# Patient Record
Sex: FEMALE | Race: WHITE | Employment: OTHER | URBAN - METROPOLITAN AREA
[De-identification: names, ages, dates, MRNs, and addresses within clinical notes are randomized per-mention and may not be internally consistent; named-entity substitution may affect disease eponyms.]

---

## 2018-12-06 ENCOUNTER — OFFICE VISIT (OUTPATIENT)
Dept: ORTHOPEDIC SURGERY | Age: 58
End: 2018-12-06
Payer: COMMERCIAL

## 2018-12-06 VITALS
TEMPERATURE: 97.3 F | WEIGHT: 120 LBS | HEART RATE: 61 BPM | DIASTOLIC BLOOD PRESSURE: 78 MMHG | SYSTOLIC BLOOD PRESSURE: 116 MMHG | HEIGHT: 65 IN | BODY MASS INDEX: 19.99 KG/M2

## 2018-12-06 DIAGNOSIS — Z96.641 H/O TOTAL HIP ARTHROPLASTY, RIGHT: ICD-10-CM

## 2018-12-06 DIAGNOSIS — Z96.652 H/O TOTAL KNEE REPLACEMENT, LEFT: ICD-10-CM

## 2018-12-06 DIAGNOSIS — Z96.651 H/O TOTAL KNEE REPLACEMENT, RIGHT: ICD-10-CM

## 2018-12-06 DIAGNOSIS — M25.561 RIGHT KNEE PAIN, UNSPECIFIED CHRONICITY: Primary | ICD-10-CM

## 2018-12-06 PROCEDURE — G8427 DOCREV CUR MEDS BY ELIG CLIN: HCPCS | Performed by: ORTHOPAEDIC SURGERY

## 2018-12-06 PROCEDURE — 99203 OFFICE O/P NEW LOW 30 MIN: CPT | Performed by: ORTHOPAEDIC SURGERY

## 2018-12-06 PROCEDURE — 3017F COLORECTAL CA SCREEN DOC REV: CPT | Performed by: ORTHOPAEDIC SURGERY

## 2018-12-06 PROCEDURE — 1036F TOBACCO NON-USER: CPT | Performed by: ORTHOPAEDIC SURGERY

## 2018-12-06 PROCEDURE — G8420 CALC BMI NORM PARAMETERS: HCPCS | Performed by: ORTHOPAEDIC SURGERY

## 2018-12-06 PROCEDURE — G8484 FLU IMMUNIZE NO ADMIN: HCPCS | Performed by: ORTHOPAEDIC SURGERY

## 2018-12-06 RX ORDER — ADALIMUMAB 40MG/0.4ML
KIT SUBCUTANEOUS
COMMUNITY
Start: 2018-11-08

## 2018-12-06 RX ORDER — PRAMIPEXOLE DIHYDROCHLORIDE 0.25 MG/1
0.25 TABLET ORAL NIGHTLY
COMMUNITY
Start: 2018-09-22

## 2018-12-06 NOTE — PROGRESS NOTES
Chief Complaint:   Chief Complaint   Patient presents with    Knee Pain     Rt knee pain x 1 year. Hx Rt TKA  Difficutly walking. No recent X-rays        HPI 50-year-old woman with history of rheumatoid arthritis. History of bilateral total hip arthroplasties and bilateral total knee arthroplasties done elsewhere. She did have revision of her left knee tibial poly-by me several years ago due to persistent pain and effusions. Infection was never diagnosed. Left knee has done well since then. She had been having some similar symptoms in the right knee, not to the level of pursuing revision. This apparently improved although recently she's had some increased discomfort in the right knee and thigh activity related. No injury history.     Patient Active Problem List   Diagnosis    Rheumatoid arthritis ([address]    H/O total knee replacement, right    H/O total hip arthroplasty, right    H/O total knee replacement, left       Past Medical History:   Diagnosis Date    Hypothyroidism     Rheumatoid arthritis ([address]        Past Surgical History:   Procedure Laterality Date    ANKLE FRACTURE SURGERY Left 2016    ORIF Lt ankle fracture    BACK SURGERY  2000's    ELBOW FRACTURE SURGERY Left 2015    Periprosthetic fracture    ELBOW SURGERY Right 1990's    Joint replacement    ELBOW SURGERY Left 1990's    Joint Replacement    HAND SURGERY Bilateral     Fusions    HIP SURGERY Right 1981    Rt TON    HIP SURGERY Left 1990    Lt TON    KNEE SURGERY Bilateral     Bilateral TKA's     PARTIAL HYSTERECTOMY  1999    SHOULDER ARTHROSCOPY Right     Cuff repair       Current Outpatient Prescriptions   Medication Sig Dispense Refill    HUMIRA PEN 40 MG/0.4ML PNKT every 14 days       pramipexole (MIRAPEX) 0.25 MG tablet Take 0.25 mg by mouth nightly       folic acid (FOLVITE) 1 MG tablet       levothyroxine (SYNTHROID) 75 MCG tablet Take 75 mcg by mouth Daily       methotrexate 2.5 MG tablet once a week Takes 6 pills (15 mg once a week on Mondays)      predniSONE (DELTASONE) 5 MG tablet Take 5 mg by mouth daily       Calcium + D (CALTRATE) 600-200 MG-UNIT tablet Take 1 tablet by mouth daily.  NITRO-BID 2 % ointment       Zinc 50 MG CAPS Take  by mouth.  Biotin 1000 MCG TABS Take  by mouth. No current facility-administered medications for this visit. No Known Allergies    Social History     Social History    Marital status:      Spouse name: N/A    Number of children: N/A    Years of education: N/A     Social History Main Topics    Smoking status: Never Smoker    Smokeless tobacco: Never Used    Alcohol use No    Drug use: No    Sexual activity: Not Asked     Other Topics Concern    None     Social History Narrative    None       Family History   Problem Relation Age of Onset    Diabetes Father     Pacemaker Father          Review of Systems   No fever, chills, or other constitutionalsymptoms. No numbness or other neuro symptoms. [unfilled]   She is walking independent relief for weightbearing with a slight antalgic limp favoring the right thigh. Leg lengths are equal. Full rotation of right hip without pain. No pain with piston testing. Right knee has no effusion or crepitation with full range of motion. Physical Exam    Patient is alert and oriented. Well-developed well-nourished. Overall appearance consistent with chronic rheumatoid illness. BMI 20  Pupils equal and reactive. Scleraeanicteric. Neck supple  Lungs clear. Cardiac rate and rhythm regular. Abdomen soft and nontender. Skin warm and dry. XRAY: AP pelvis x-ray with AP and lateral views right hip today. Bilateral total hip arthroplasties are noted. The left hip has an oversized acetabular component with screws and a cerclage wire around the femur. Right hip is primary implant with no visible wear lucencies or subsidence on either implant.  Femoral heads bilaterally may be slightly off center

## 2019-01-21 ENCOUNTER — OFFICE VISIT (OUTPATIENT)
Dept: ORTHOPEDIC SURGERY | Age: 59
End: 2019-01-21
Payer: COMMERCIAL

## 2019-01-21 VITALS
HEIGHT: 65 IN | SYSTOLIC BLOOD PRESSURE: 113 MMHG | WEIGHT: 120 LBS | DIASTOLIC BLOOD PRESSURE: 68 MMHG | BODY MASS INDEX: 19.99 KG/M2 | TEMPERATURE: 97.8 F | HEART RATE: 62 BPM

## 2019-01-21 DIAGNOSIS — M25.551 PAIN OF RIGHT HIP JOINT: Primary | ICD-10-CM

## 2019-01-21 DIAGNOSIS — M54.50 LUMBAR PAIN: ICD-10-CM

## 2019-01-21 DIAGNOSIS — G89.29 CHRONIC PAIN OF RIGHT KNEE: ICD-10-CM

## 2019-01-21 DIAGNOSIS — M25.561 CHRONIC PAIN OF RIGHT KNEE: ICD-10-CM

## 2019-01-21 PROCEDURE — G8427 DOCREV CUR MEDS BY ELIG CLIN: HCPCS | Performed by: ORTHOPAEDIC SURGERY

## 2019-01-21 PROCEDURE — 3017F COLORECTAL CA SCREEN DOC REV: CPT | Performed by: ORTHOPAEDIC SURGERY

## 2019-01-21 PROCEDURE — 1036F TOBACCO NON-USER: CPT | Performed by: ORTHOPAEDIC SURGERY

## 2019-01-21 PROCEDURE — G8484 FLU IMMUNIZE NO ADMIN: HCPCS | Performed by: ORTHOPAEDIC SURGERY

## 2019-01-21 PROCEDURE — G8420 CALC BMI NORM PARAMETERS: HCPCS | Performed by: ORTHOPAEDIC SURGERY

## 2019-01-21 PROCEDURE — 99214 OFFICE O/P EST MOD 30 MIN: CPT | Performed by: ORTHOPAEDIC SURGERY

## 2019-01-31 ENCOUNTER — HOSPITAL ENCOUNTER (OUTPATIENT)
Age: 59
Discharge: HOME | End: 2019-02-02
Payer: COMMERCIAL

## 2019-01-31 LAB
ALBUMIN SERPL-MCNC: 4 G/DL (ref 3.5–5.2)
ALP BLD-CCNC: 54 U/L (ref 35–104)
ALT SERPL-CCNC: 11 U/L (ref 0–32)
ANION GAP SERPL CALCULATED.3IONS-SCNC: 11 MMOL/L (ref 7–16)
AST SERPL-CCNC: 22 U/L (ref 0–31)
BASOPHILS ABSOLUTE: 0.04 E9/L (ref 0–0.2)
BASOPHILS RELATIVE PERCENT: 0.5 % (ref 0–2)
BILIRUB SERPL-MCNC: 0.4 MG/DL (ref 0–1.2)
BUN BLDV-MCNC: 24 MG/DL (ref 6–20)
CALCIUM SERPL-MCNC: 9.7 MG/DL (ref 8.6–10.2)
CHLORIDE BLD-SCNC: 106 MMOL/L (ref 98–107)
CO2: 26 MMOL/L (ref 22–29)
CREAT SERPL-MCNC: 0.9 MG/DL (ref 0.5–1)
EOSINOPHILS ABSOLUTE: 0.04 E9/L (ref 0.05–0.5)
EOSINOPHILS RELATIVE PERCENT: 0.5 % (ref 0–6)
GFR AFRICAN AMERICAN: >60
GFR NON-AFRICAN AMERICAN: >60 ML/MIN/1.73
GLUCOSE BLD-MCNC: 83 MG/DL (ref 74–99)
HCT VFR BLD CALC: 38.5 % (ref 34–48)
HEMOGLOBIN: 12.6 G/DL (ref 11.5–15.5)
IMMATURE GRANULOCYTES #: 0.03 E9/L
IMMATURE GRANULOCYTES %: 0.4 % (ref 0–5)
LYMPHOCYTES ABSOLUTE: 0.95 E9/L (ref 1.5–4)
LYMPHOCYTES RELATIVE PERCENT: 12.7 % (ref 20–42)
MCH RBC QN AUTO: 31.8 PG (ref 26–35)
MCHC RBC AUTO-ENTMCNC: 32.7 % (ref 32–34.5)
MCV RBC AUTO: 97.2 FL (ref 80–99.9)
MONOCYTES ABSOLUTE: 0.59 E9/L (ref 0.1–0.95)
MONOCYTES RELATIVE PERCENT: 7.9 % (ref 2–12)
NEUTROPHILS ABSOLUTE: 5.81 E9/L (ref 1.8–7.3)
NEUTROPHILS RELATIVE PERCENT: 78 % (ref 43–80)
PDW BLD-RTO: 14.2 FL (ref 11.5–15)
PLATELET # BLD: 212 E9/L (ref 130–450)
PMV BLD AUTO: 10.1 FL (ref 7–12)
POTASSIUM SERPL-SCNC: 4.9 MMOL/L (ref 3.5–5)
RBC # BLD: 3.96 E12/L (ref 3.5–5.5)
RHEUMATOID FACTOR: 37 IU/ML (ref 0–13)
SODIUM BLD-SCNC: 143 MMOL/L (ref 132–146)
TOTAL PROTEIN: 7.3 G/DL (ref 6.4–8.3)
WBC # BLD: 7.5 E9/L (ref 4.5–11.5)

## 2019-01-31 PROCEDURE — 85025 COMPLETE CBC W/AUTO DIFF WBC: CPT

## 2019-01-31 PROCEDURE — 86200 CCP ANTIBODY: CPT

## 2019-01-31 PROCEDURE — 86431 RHEUMATOID FACTOR QUANT: CPT

## 2019-01-31 PROCEDURE — 80053 COMPREHEN METABOLIC PANEL: CPT

## 2019-01-31 PROCEDURE — 36415 COLL VENOUS BLD VENIPUNCTURE: CPT

## 2019-02-03 LAB — CCP IGG ANTIBODIES: 83 UNITS (ref 0–19)

## 2019-04-25 ENCOUNTER — HOSPITAL ENCOUNTER (OUTPATIENT)
Age: 59
Discharge: HOME | End: 2019-04-27
Payer: COMMERCIAL

## 2019-04-25 LAB
ALBUMIN SERPL-MCNC: 4.3 G/DL (ref 3.5–5.2)
ALP BLD-CCNC: 59 U/L (ref 35–104)
ALT SERPL-CCNC: 9 U/L (ref 0–32)
ANION GAP SERPL CALCULATED.3IONS-SCNC: 9 MMOL/L (ref 7–16)
AST SERPL-CCNC: 20 U/L (ref 0–31)
BASOPHILS ABSOLUTE: 0.04 E9/L (ref 0–0.2)
BASOPHILS RELATIVE PERCENT: 0.4 % (ref 0–2)
BILIRUB SERPL-MCNC: 0.4 MG/DL (ref 0–1.2)
BUN BLDV-MCNC: 22 MG/DL (ref 6–20)
C-REACTIVE PROTEIN: 0.2 MG/DL (ref 0–0.4)
CALCIUM SERPL-MCNC: 10.5 MG/DL (ref 8.6–10.2)
CHLORIDE BLD-SCNC: 107 MMOL/L (ref 98–107)
CO2: 29 MMOL/L (ref 22–29)
CREAT SERPL-MCNC: 0.8 MG/DL (ref 0.5–1)
EOSINOPHILS ABSOLUTE: 0.02 E9/L (ref 0.05–0.5)
EOSINOPHILS RELATIVE PERCENT: 0.2 % (ref 0–6)
GFR AFRICAN AMERICAN: >60
GFR NON-AFRICAN AMERICAN: >60 ML/MIN/1.73
GLUCOSE BLD-MCNC: 97 MG/DL (ref 74–99)
HCT VFR BLD CALC: 39.5 % (ref 34–48)
HEMOGLOBIN: 12.8 G/DL (ref 11.5–15.5)
IMMATURE GRANULOCYTES #: 0.03 E9/L
IMMATURE GRANULOCYTES %: 0.3 % (ref 0–5)
LYMPHOCYTES ABSOLUTE: 0.94 E9/L (ref 1.5–4)
LYMPHOCYTES RELATIVE PERCENT: 10.4 % (ref 20–42)
MCH RBC QN AUTO: 32.1 PG (ref 26–35)
MCHC RBC AUTO-ENTMCNC: 32.4 % (ref 32–34.5)
MCV RBC AUTO: 99 FL (ref 80–99.9)
MONOCYTES ABSOLUTE: 0.42 E9/L (ref 0.1–0.95)
MONOCYTES RELATIVE PERCENT: 4.6 % (ref 2–12)
NEUTROPHILS ABSOLUTE: 7.6 E9/L (ref 1.8–7.3)
NEUTROPHILS RELATIVE PERCENT: 84.1 % (ref 43–80)
PDW BLD-RTO: 14.4 FL (ref 11.5–15)
PLATELET # BLD: 227 E9/L (ref 130–450)
PMV BLD AUTO: 10.8 FL (ref 7–12)
POTASSIUM SERPL-SCNC: 4.6 MMOL/L (ref 3.5–5)
RBC # BLD: 3.99 E12/L (ref 3.5–5.5)
SODIUM BLD-SCNC: 145 MMOL/L (ref 132–146)
TOTAL PROTEIN: 7.4 G/DL (ref 6.4–8.3)
WBC # BLD: 9.1 E9/L (ref 4.5–11.5)

## 2019-04-25 PROCEDURE — 86140 C-REACTIVE PROTEIN: CPT

## 2019-04-25 PROCEDURE — 80053 COMPREHEN METABOLIC PANEL: CPT

## 2019-04-25 PROCEDURE — 85025 COMPLETE CBC W/AUTO DIFF WBC: CPT

## 2019-07-25 ENCOUNTER — HOSPITAL ENCOUNTER (OUTPATIENT)
Age: 59
Discharge: HOME | End: 2019-07-27
Payer: COMMERCIAL

## 2019-07-25 LAB
BASOPHILS ABSOLUTE: 0.03 E9/L (ref 0–0.2)
BASOPHILS RELATIVE PERCENT: 0.3 % (ref 0–2)
EOSINOPHILS ABSOLUTE: 0.01 E9/L (ref 0.05–0.5)
EOSINOPHILS RELATIVE PERCENT: 0.1 % (ref 0–6)
HCT VFR BLD CALC: 44.7 % (ref 34–48)
HEMOGLOBIN: 14 G/DL (ref 11.5–15.5)
IMMATURE GRANULOCYTES #: 0.05 E9/L
IMMATURE GRANULOCYTES %: 0.4 % (ref 0–5)
LYMPHOCYTES ABSOLUTE: 1.36 E9/L (ref 1.5–4)
LYMPHOCYTES RELATIVE PERCENT: 11.4 % (ref 20–42)
MCH RBC QN AUTO: 31.3 PG (ref 26–35)
MCHC RBC AUTO-ENTMCNC: 31.3 % (ref 32–34.5)
MCV RBC AUTO: 100 FL (ref 80–99.9)
MONOCYTES ABSOLUTE: 0.8 E9/L (ref 0.1–0.95)
MONOCYTES RELATIVE PERCENT: 6.7 % (ref 2–12)
NEUTROPHILS ABSOLUTE: 9.7 E9/L (ref 1.8–7.3)
NEUTROPHILS RELATIVE PERCENT: 81.1 % (ref 43–80)
PDW BLD-RTO: 13.9 FL (ref 11.5–15)
PLATELET # BLD: 248 E9/L (ref 130–450)
PMV BLD AUTO: 10.2 FL (ref 7–12)
RBC # BLD: 4.47 E12/L (ref 3.5–5.5)
WBC # BLD: 12 E9/L (ref 4.5–11.5)

## 2019-07-25 PROCEDURE — 80053 COMPREHEN METABOLIC PANEL: CPT

## 2019-07-25 PROCEDURE — 36415 COLL VENOUS BLD VENIPUNCTURE: CPT

## 2019-07-25 PROCEDURE — 85025 COMPLETE CBC W/AUTO DIFF WBC: CPT

## 2019-07-26 LAB
ALBUMIN SERPL-MCNC: 4.2 G/DL (ref 3.5–5.2)
ALP BLD-CCNC: 75 U/L (ref 35–104)
ALT SERPL-CCNC: 15 U/L (ref 0–32)
ANION GAP SERPL CALCULATED.3IONS-SCNC: 14 MMOL/L (ref 7–16)
AST SERPL-CCNC: 19 U/L (ref 0–31)
BILIRUB SERPL-MCNC: 0.4 MG/DL (ref 0–1.2)
BUN BLDV-MCNC: 25 MG/DL (ref 6–20)
CALCIUM SERPL-MCNC: 10.6 MG/DL (ref 8.6–10.2)
CHLORIDE BLD-SCNC: 100 MMOL/L (ref 98–107)
CO2: 28 MMOL/L (ref 22–29)
CREAT SERPL-MCNC: 0.8 MG/DL (ref 0.5–1)
GFR AFRICAN AMERICAN: >60
GFR NON-AFRICAN AMERICAN: >60 ML/MIN/1.73
GLUCOSE BLD-MCNC: 102 MG/DL (ref 74–99)
POTASSIUM SERPL-SCNC: 4.8 MMOL/L (ref 3.5–5)
SODIUM BLD-SCNC: 142 MMOL/L (ref 132–146)
TOTAL PROTEIN: 7.8 G/DL (ref 6.4–8.3)

## 2019-10-29 ENCOUNTER — HOSPITAL ENCOUNTER (OUTPATIENT)
Age: 59
Discharge: HOME | End: 2019-10-31
Payer: COMMERCIAL

## 2019-10-29 LAB
ALBUMIN SERPL-MCNC: 4.2 G/DL (ref 3.5–5.2)
ALP BLD-CCNC: 71 U/L (ref 35–104)
ALT SERPL-CCNC: 14 U/L (ref 0–32)
ANION GAP SERPL CALCULATED.3IONS-SCNC: 11 MMOL/L (ref 7–16)
AST SERPL-CCNC: 23 U/L (ref 0–31)
BASOPHILS ABSOLUTE: 0.04 E9/L (ref 0–0.2)
BASOPHILS RELATIVE PERCENT: 0.3 % (ref 0–2)
BILIRUB SERPL-MCNC: 0.3 MG/DL (ref 0–1.2)
BUN BLDV-MCNC: 25 MG/DL (ref 8–23)
C-REACTIVE PROTEIN: 0.3 MG/DL (ref 0–0.4)
CALCIUM SERPL-MCNC: 10.1 MG/DL (ref 8.6–10.2)
CHLORIDE BLD-SCNC: 103 MMOL/L (ref 98–107)
CO2: 29 MMOL/L (ref 22–29)
CREAT SERPL-MCNC: 0.9 MG/DL (ref 0.5–1)
EOSINOPHILS ABSOLUTE: 0.01 E9/L (ref 0.05–0.5)
EOSINOPHILS RELATIVE PERCENT: 0.1 % (ref 0–6)
GFR AFRICAN AMERICAN: >60
GFR NON-AFRICAN AMERICAN: >60 ML/MIN/1.73
GLUCOSE BLD-MCNC: 92 MG/DL (ref 74–99)
HCT VFR BLD CALC: 43.6 % (ref 34–48)
HEMOGLOBIN: 13 G/DL (ref 11.5–15.5)
IMMATURE GRANULOCYTES #: 0.06 E9/L
IMMATURE GRANULOCYTES %: 0.5 % (ref 0–5)
LYMPHOCYTES ABSOLUTE: 1.63 E9/L (ref 1.5–4)
LYMPHOCYTES RELATIVE PERCENT: 12.6 % (ref 20–42)
MCH RBC QN AUTO: 30.4 PG (ref 26–35)
MCHC RBC AUTO-ENTMCNC: 29.8 % (ref 32–34.5)
MCV RBC AUTO: 101.9 FL (ref 80–99.9)
MONOCYTES ABSOLUTE: 0.86 E9/L (ref 0.1–0.95)
MONOCYTES RELATIVE PERCENT: 6.7 % (ref 2–12)
NEUTROPHILS ABSOLUTE: 10.33 E9/L (ref 1.8–7.3)
NEUTROPHILS RELATIVE PERCENT: 79.8 % (ref 43–80)
PDW BLD-RTO: 14.3 FL (ref 11.5–15)
PLATELET # BLD: 315 E9/L (ref 130–450)
PMV BLD AUTO: 10.4 FL (ref 7–12)
POTASSIUM SERPL-SCNC: 4.8 MMOL/L (ref 3.5–5)
RBC # BLD: 4.28 E12/L (ref 3.5–5.5)
SODIUM BLD-SCNC: 143 MMOL/L (ref 132–146)
TOTAL PROTEIN: 7.9 G/DL (ref 6.4–8.3)
URIC ACID, SERUM: 4 MG/DL (ref 2.4–5.7)
VITAMIN D 25-HYDROXY: 67 NG/ML (ref 30–100)
WBC # BLD: 12.9 E9/L (ref 4.5–11.5)

## 2019-10-29 PROCEDURE — 86140 C-REACTIVE PROTEIN: CPT

## 2019-10-29 PROCEDURE — 36415 COLL VENOUS BLD VENIPUNCTURE: CPT

## 2019-10-29 PROCEDURE — 80053 COMPREHEN METABOLIC PANEL: CPT

## 2019-10-29 PROCEDURE — 84550 ASSAY OF BLOOD/URIC ACID: CPT

## 2019-10-29 PROCEDURE — 82306 VITAMIN D 25 HYDROXY: CPT

## 2019-10-29 PROCEDURE — 85025 COMPLETE CBC W/AUTO DIFF WBC: CPT

## 2020-01-23 ENCOUNTER — HOSPITAL ENCOUNTER (OUTPATIENT)
Age: 60
Discharge: HOME | End: 2020-01-25
Payer: COMMERCIAL

## 2020-01-23 LAB
ALBUMIN SERPL-MCNC: 4 G/DL (ref 3.5–5.2)
ALP BLD-CCNC: 64 U/L (ref 35–104)
ALT SERPL-CCNC: 8 U/L (ref 0–32)
ANION GAP SERPL CALCULATED.3IONS-SCNC: 18 MMOL/L (ref 7–16)
AST SERPL-CCNC: 19 U/L (ref 0–31)
BASOPHILS ABSOLUTE: 0.04 E9/L (ref 0–0.2)
BASOPHILS RELATIVE PERCENT: 0.4 % (ref 0–2)
BILIRUB SERPL-MCNC: <0.2 MG/DL (ref 0–1.2)
BUN BLDV-MCNC: 21 MG/DL (ref 8–23)
CALCIUM SERPL-MCNC: 9.8 MG/DL (ref 8.6–10.2)
CHLORIDE BLD-SCNC: 106 MMOL/L (ref 98–107)
CO2: 21 MMOL/L (ref 22–29)
CREAT SERPL-MCNC: 0.9 MG/DL (ref 0.5–1)
EOSINOPHILS ABSOLUTE: 0.08 E9/L (ref 0.05–0.5)
EOSINOPHILS RELATIVE PERCENT: 0.8 % (ref 0–6)
FERRITIN: 184 NG/ML
GFR AFRICAN AMERICAN: >60
GFR NON-AFRICAN AMERICAN: >60 ML/MIN/1.73
GLUCOSE BLD-MCNC: 109 MG/DL (ref 74–99)
HCT VFR BLD CALC: 41.4 % (ref 34–48)
HEMOGLOBIN: 12.8 G/DL (ref 11.5–15.5)
IMMATURE GRANULOCYTES #: 0.02 E9/L
IMMATURE GRANULOCYTES %: 0.2 % (ref 0–5)
LYMPHOCYTES ABSOLUTE: 0.97 E9/L (ref 1.5–4)
LYMPHOCYTES RELATIVE PERCENT: 9.4 % (ref 20–42)
MCH RBC QN AUTO: 29.6 PG (ref 26–35)
MCHC RBC AUTO-ENTMCNC: 30.9 % (ref 32–34.5)
MCV RBC AUTO: 95.8 FL (ref 80–99.9)
MONOCYTES ABSOLUTE: 0.69 E9/L (ref 0.1–0.95)
MONOCYTES RELATIVE PERCENT: 6.7 % (ref 2–12)
NEUTROPHILS ABSOLUTE: 8.5 E9/L (ref 1.8–7.3)
NEUTROPHILS RELATIVE PERCENT: 82.5 % (ref 43–80)
PDW BLD-RTO: 13.9 FL (ref 11.5–15)
PLATELET # BLD: 246 E9/L (ref 130–450)
PMV BLD AUTO: 10 FL (ref 7–12)
POTASSIUM SERPL-SCNC: 4.4 MMOL/L (ref 3.5–5)
RBC # BLD: 4.32 E12/L (ref 3.5–5.5)
SODIUM BLD-SCNC: 145 MMOL/L (ref 132–146)
TOTAL PROTEIN: 7.3 G/DL (ref 6.4–8.3)
WBC # BLD: 10.3 E9/L (ref 4.5–11.5)

## 2020-01-23 PROCEDURE — 36415 COLL VENOUS BLD VENIPUNCTURE: CPT

## 2020-01-23 PROCEDURE — 85025 COMPLETE CBC W/AUTO DIFF WBC: CPT

## 2020-01-23 PROCEDURE — 80053 COMPREHEN METABOLIC PANEL: CPT

## 2020-01-23 PROCEDURE — 82728 ASSAY OF FERRITIN: CPT

## 2020-02-20 ENCOUNTER — HOSPITAL ENCOUNTER (OUTPATIENT)
Age: 60
Discharge: HOME | End: 2020-02-22
Payer: COMMERCIAL

## 2020-02-20 PROCEDURE — 88175 CYTOPATH C/V AUTO FLUID REDO: CPT

## 2020-03-18 ENCOUNTER — TELEPHONE (OUTPATIENT)
Dept: ADMINISTRATIVE | Age: 60
End: 2020-03-18

## 2020-03-18 NOTE — TELEPHONE ENCOUNTER
New pt referred by Dr. Sarah Burnette for abnormal EKG. Pt does state she is scheduled for cataract surgery next wk and PCP would not clear her d/t EKG. Pt was encouraged to vián eye surgeon to see if surgery will still take place. Appt scheduled with Dr. Laura Vasquez on 3/19/20. No previous cardiologist.  Cardiac past history form scanned.

## 2020-03-19 ENCOUNTER — OFFICE VISIT (OUTPATIENT)
Dept: CARDIOLOGY CLINIC | Age: 60
End: 2020-03-19
Payer: COMMERCIAL

## 2020-03-19 VITALS
DIASTOLIC BLOOD PRESSURE: 78 MMHG | HEIGHT: 65 IN | BODY MASS INDEX: 20.64 KG/M2 | HEART RATE: 65 BPM | RESPIRATION RATE: 16 BRPM | WEIGHT: 123.9 LBS | SYSTOLIC BLOOD PRESSURE: 146 MMHG

## 2020-03-19 PROBLEM — R94.31 ABNORMAL EKG: Status: ACTIVE | Noted: 2020-03-19

## 2020-03-19 PROCEDURE — 99244 OFF/OP CNSLTJ NEW/EST MOD 40: CPT | Performed by: INTERNAL MEDICINE

## 2020-03-19 PROCEDURE — G8420 CALC BMI NORM PARAMETERS: HCPCS | Performed by: INTERNAL MEDICINE

## 2020-03-19 PROCEDURE — G8427 DOCREV CUR MEDS BY ELIG CLIN: HCPCS | Performed by: INTERNAL MEDICINE

## 2020-03-19 PROCEDURE — G8484 FLU IMMUNIZE NO ADMIN: HCPCS | Performed by: INTERNAL MEDICINE

## 2020-03-19 PROCEDURE — 93000 ELECTROCARDIOGRAM COMPLETE: CPT | Performed by: INTERNAL MEDICINE

## 2020-03-19 NOTE — PROGRESS NOTES
connections     Talks on phone: Not on file     Gets together: Not on file     Attends L[employer] service: Not on file     Active member of club or organization: Not on file     Attends meetings of clubs or organizations: Not on file     Relationship status: Not on file    Intimate partner violence     Fear of current or ex partner: Not on file     Emotionally abused: Not on file     Physically abused: Not on file     Forced sexual activity: Not on file   Other Topics Concern    Not on file   Social History Narrative    Not on file       Family History   Problem Relation Age of Onset    Diabetes Father     Pacemaker Father          SUBJECTIVE: Kita Hunt presents to the office today for pre-op evaluation / consult - dr. Barb Roy - prior to cataract surgery. No cardiac hx. No ekg for years. .  She complains of no cardiac symptoms and denies   chest pain, chest pressure/discomfort, claudication, dyspnea, exertional chest pressure/discomfort, fatigue, irregular heart beat, lower extremity edema, near-syncope, orthopnea, palpitations, paroxysmal nocturnal dyspnea, syncope and tachypnea. She claims to be very active with chores with absolutely NO change in functional capacity. Dad with hx of MI  Non smoker, no HLD or HTN        Review of Systems:   Heart: as above   Lungs: as above   Eyes: denies changes in vision or discharge. Ears: denies changes in hearing or pain. Nose: denies epistaxis or masses   Throat: denies sore throat or trouble swallowing. Neuro: denies numbness, tingling, tremors. Skin: denies rashes or itching. : denies hematuria, dysuria   GI: denies vomiting, diarrhea   Psych: denies mood changed, anxiety, depression. all others negative. Physical Exam   BP (!) 146/78   Pulse 65   Resp 16   Ht 5' 5\" (1.651 m)   Wt 123 lb 14.4 oz (56.2 kg)   BMI 20.62 kg/m²   Constitutional: Oriented to person, place, and time. Well-developed and well-nourished. No distress.     Head: Normocephalic and atraumatic. Eyes: EOM are normal. Pupils are equal, round, and reactive to light. Neck: Normal range of motion. Neck supple. No hepatojugular reflux and no JVD present. Carotid bruit is not present. No tracheal deviation present. No thyromegaly present. Cardiovascular: Normal rate, regular rhythm, normal heart sounds and intact distal pulses. Exam reveals no gallop and no friction rub. No murmur heard. Pulmonary/Chest: Effort normal and breath sounds normal. No respiratory distress. No wheezes. No rales. No tenderness. Abdominal: Soft. Bowel sounds are normal. No distension and no mass. No tenderness. No rebound and no guarding. Musculoskeletal: Normal range of motion. No edema and no tenderness. Neurological: Alert and oriented to person, place, and time. Skin: Skin is warm and dry. No rash noted. Not diaphoretic. No erythema. Psychiatric: Normal mood and affect. Behavior is normal.     EKG:  normal sinus rhythm, inferolateral T wave changes, there are no previous tracings available for comparison.     ASSESSMENT AND PLAN:  Patient Active Problem List   Diagnosis    Rheumatoid arthritis ([address]    Abnormal EKG       Patient is ok for cataract surgery at imperceptibly low risk  However, her ekg is abnormal  Must rule out CAD - [place] as she has orthopedic limitations    Rhys Zuñiga M.D  [facility]

## 2020-03-25 ENCOUNTER — TELEPHONE (OUTPATIENT)
Dept: CARDIOLOGY | Age: 60
End: 2020-03-25

## 2020-03-26 ENCOUNTER — HOSPITAL ENCOUNTER (OUTPATIENT)
Dept: CARDIOLOGY | Age: 60
Discharge: HOME | End: 2020-03-26
Payer: COMMERCIAL

## 2020-03-26 ENCOUNTER — TELEPHONE (OUTPATIENT)
Dept: CARDIOLOGY CLINIC | Age: 60
End: 2020-03-26

## 2020-03-26 VITALS
HEART RATE: 76 BPM | SYSTOLIC BLOOD PRESSURE: 110 MMHG | WEIGHT: 118 LBS | BODY MASS INDEX: 19.66 KG/M2 | HEIGHT: 65 IN | DIASTOLIC BLOOD PRESSURE: 60 MMHG

## 2020-03-26 PROCEDURE — 78452 HT MUSCLE IMAGE SPECT MULT: CPT

## 2020-03-26 PROCEDURE — 93017 CV STRESS TEST TRACING ONLY: CPT

## 2020-03-26 PROCEDURE — 2580000003 HC RX 258: Performed by: INTERNAL MEDICINE

## 2020-03-26 PROCEDURE — 6360000002 HC RX W HCPCS: Performed by: INTERNAL MEDICINE

## 2020-03-26 PROCEDURE — A9500 TC99M SESTAMIBI: HCPCS | Performed by: INTERNAL MEDICINE

## 2020-03-26 PROCEDURE — 3430000000 HC RX DIAGNOSTIC RADIOPHARMACEUTICAL: Performed by: INTERNAL MEDICINE

## 2020-03-26 RX ORDER — SODIUM CHLORIDE 0.9 % (FLUSH) 0.9 %
10 SYRINGE (ML) INJECTION PRN
Status: DISCONTINUED | OUTPATIENT
Start: 2020-03-26 | End: 2020-03-27 | Stop reason: HOSPADM

## 2020-03-26 RX ADMIN — SODIUM CHLORIDE, PRESERVATIVE FREE 10 ML: 5 INJECTION INTRAVENOUS at 11:09

## 2020-03-26 RX ADMIN — REGADENOSON 0.4 MG: 0.08 INJECTION, SOLUTION INTRAVENOUS at 11:09

## 2020-03-26 RX ADMIN — Medication 31.5 MILLICURIE: at 11:09

## 2020-03-26 RX ADMIN — SODIUM CHLORIDE, PRESERVATIVE FREE 10 ML: 5 INJECTION INTRAVENOUS at 09:10

## 2020-03-26 RX ADMIN — SODIUM CHLORIDE, PRESERVATIVE FREE 10 ML: 5 INJECTION INTRAVENOUS at 11:10

## 2020-03-26 RX ADMIN — Medication 11.1 MILLICURIE: at 09:09

## 2020-03-26 NOTE — TELEPHONE ENCOUNTER
----- Message from Elaine Boyle MD sent at 3/26/2020  1:12 PM EDT -----  Stress normal  Ok for cataract surgery  Ov prn

## 2020-04-30 ENCOUNTER — HOSPITAL ENCOUNTER (OUTPATIENT)
Age: 60
Discharge: HOME | End: 2020-05-02
Payer: COMMERCIAL

## 2020-04-30 LAB
ALBUMIN SERPL-MCNC: 4.1 G/DL (ref 3.5–5.2)
ALP BLD-CCNC: 66 U/L (ref 35–104)
ALT SERPL-CCNC: 9 U/L (ref 0–32)
ANION GAP SERPL CALCULATED.3IONS-SCNC: 10 MMOL/L (ref 7–16)
AST SERPL-CCNC: 20 U/L (ref 0–31)
BASOPHILS ABSOLUTE: 0.02 E9/L (ref 0–0.2)
BASOPHILS RELATIVE PERCENT: 0.2 % (ref 0–2)
BILIRUB SERPL-MCNC: 0.3 MG/DL (ref 0–1.2)
BUN BLDV-MCNC: 26 MG/DL (ref 8–23)
C-REACTIVE PROTEIN: 0.4 MG/DL (ref 0–0.4)
CALCIUM SERPL-MCNC: 10.5 MG/DL (ref 8.6–10.2)
CHLORIDE BLD-SCNC: 106 MMOL/L (ref 98–107)
CO2: 26 MMOL/L (ref 22–29)
CREAT SERPL-MCNC: 0.8 MG/DL (ref 0.5–1)
EOSINOPHILS ABSOLUTE: 0.02 E9/L (ref 0.05–0.5)
EOSINOPHILS RELATIVE PERCENT: 0.2 % (ref 0–6)
GFR AFRICAN AMERICAN: >60
GFR NON-AFRICAN AMERICAN: >60 ML/MIN/1.73
GLUCOSE BLD-MCNC: 135 MG/DL (ref 74–99)
HCT VFR BLD CALC: 40.3 % (ref 34–48)
HEMOGLOBIN: 12.6 G/DL (ref 11.5–15.5)
IMMATURE GRANULOCYTES #: 0.02 E9/L
IMMATURE GRANULOCYTES %: 0.2 % (ref 0–5)
LYMPHOCYTES ABSOLUTE: 1.04 E9/L (ref 1.5–4)
LYMPHOCYTES RELATIVE PERCENT: 10.8 % (ref 20–42)
MCH RBC QN AUTO: 29.7 PG (ref 26–35)
MCHC RBC AUTO-ENTMCNC: 31.3 % (ref 32–34.5)
MCV RBC AUTO: 95 FL (ref 80–99.9)
MONOCYTES ABSOLUTE: 0.5 E9/L (ref 0.1–0.95)
MONOCYTES RELATIVE PERCENT: 5.2 % (ref 2–12)
NEUTROPHILS ABSOLUTE: 7.99 E9/L (ref 1.8–7.3)
NEUTROPHILS RELATIVE PERCENT: 83.4 % (ref 43–80)
PDW BLD-RTO: 14 FL (ref 11.5–15)
PLATELET # BLD: 273 E9/L (ref 130–450)
PMV BLD AUTO: 10.2 FL (ref 7–12)
POTASSIUM SERPL-SCNC: 4.8 MMOL/L (ref 3.5–5)
RBC # BLD: 4.24 E12/L (ref 3.5–5.5)
SODIUM BLD-SCNC: 142 MMOL/L (ref 132–146)
TOTAL PROTEIN: 7.5 G/DL (ref 6.4–8.3)
WBC # BLD: 9.6 E9/L (ref 4.5–11.5)

## 2020-04-30 PROCEDURE — 36415 COLL VENOUS BLD VENIPUNCTURE: CPT

## 2020-04-30 PROCEDURE — 80053 COMPREHEN METABOLIC PANEL: CPT

## 2020-04-30 PROCEDURE — 86140 C-REACTIVE PROTEIN: CPT

## 2020-04-30 PROCEDURE — 85025 COMPLETE CBC W/AUTO DIFF WBC: CPT

## 2020-05-11 ENCOUNTER — TELEPHONE (OUTPATIENT)
Dept: ADMINISTRATIVE | Age: 60
End: 2020-05-11

## 2020-07-30 ENCOUNTER — HOSPITAL ENCOUNTER (OUTPATIENT)
Age: 60
Discharge: HOME | End: 2020-08-01
Payer: COMMERCIAL

## 2020-07-30 PROCEDURE — 36415 COLL VENOUS BLD VENIPUNCTURE: CPT

## 2020-07-30 PROCEDURE — 80053 COMPREHEN METABOLIC PANEL: CPT

## 2020-07-30 PROCEDURE — 85025 COMPLETE CBC W/AUTO DIFF WBC: CPT

## 2020-07-31 LAB
ALBUMIN SERPL-MCNC: 4 G/DL (ref 3.5–5.2)
ALP BLD-CCNC: 63 U/L (ref 35–104)
ALT SERPL-CCNC: 10 U/L (ref 0–32)
ANION GAP SERPL CALCULATED.3IONS-SCNC: 13 MMOL/L (ref 7–16)
AST SERPL-CCNC: 24 U/L (ref 0–31)
BASOPHILS ABSOLUTE: 0.04 E9/L (ref 0–0.2)
BASOPHILS RELATIVE PERCENT: 0.4 % (ref 0–2)
BILIRUB SERPL-MCNC: 0.3 MG/DL (ref 0–1.2)
BUN BLDV-MCNC: 18 MG/DL (ref 8–23)
CALCIUM SERPL-MCNC: 9.8 MG/DL (ref 8.6–10.2)
CHLORIDE BLD-SCNC: 105 MMOL/L (ref 98–107)
CO2: 24 MMOL/L (ref 22–29)
CREAT SERPL-MCNC: 0.7 MG/DL (ref 0.5–1)
EOSINOPHILS ABSOLUTE: 0.02 E9/L (ref 0.05–0.5)
EOSINOPHILS RELATIVE PERCENT: 0.2 % (ref 0–6)
GFR AFRICAN AMERICAN: >60
GFR NON-AFRICAN AMERICAN: >60 ML/MIN/1.73
GLUCOSE BLD-MCNC: 91 MG/DL (ref 74–99)
HCT VFR BLD CALC: 41.4 % (ref 34–48)
HEMOGLOBIN: 13 G/DL (ref 11.5–15.5)
IMMATURE GRANULOCYTES #: 0.05 E9/L
IMMATURE GRANULOCYTES %: 0.5 % (ref 0–5)
LYMPHOCYTES ABSOLUTE: 1.12 E9/L (ref 1.5–4)
LYMPHOCYTES RELATIVE PERCENT: 10.6 % (ref 20–42)
MCH RBC QN AUTO: 30.3 PG (ref 26–35)
MCHC RBC AUTO-ENTMCNC: 31.4 % (ref 32–34.5)
MCV RBC AUTO: 96.5 FL (ref 80–99.9)
MONOCYTES ABSOLUTE: 0.61 E9/L (ref 0.1–0.95)
MONOCYTES RELATIVE PERCENT: 5.8 % (ref 2–12)
NEUTROPHILS ABSOLUTE: 8.76 E9/L (ref 1.8–7.3)
NEUTROPHILS RELATIVE PERCENT: 82.5 % (ref 43–80)
PDW BLD-RTO: 14.6 FL (ref 11.5–15)
PLATELET # BLD: 265 E9/L (ref 130–450)
PMV BLD AUTO: 10.3 FL (ref 7–12)
POTASSIUM SERPL-SCNC: 4.7 MMOL/L (ref 3.5–5)
RBC # BLD: 4.29 E12/L (ref 3.5–5.5)
SODIUM BLD-SCNC: 142 MMOL/L (ref 132–146)
TOTAL PROTEIN: 7.2 G/DL (ref 6.4–8.3)
WBC # BLD: 10.6 E9/L (ref 4.5–11.5)

## 2020-08-19 ENCOUNTER — HOSPITAL ENCOUNTER (OUTPATIENT)
Age: 60
Discharge: HOME | End: 2020-08-21
Payer: COMMERCIAL

## 2020-08-19 ENCOUNTER — OFFICE VISIT (OUTPATIENT)
Dept: ORTHOPEDIC SURGERY | Age: 60
End: 2020-08-19
Payer: COMMERCIAL

## 2020-08-19 VITALS — HEIGHT: 65 IN | BODY MASS INDEX: 19.66 KG/M2 | TEMPERATURE: 97.1 F | WEIGHT: 118 LBS

## 2020-08-19 PROBLEM — Z96.651 HX OF TOTAL KNEE ARTHROPLASTY, RIGHT: Status: ACTIVE | Noted: 2020-08-19

## 2020-08-19 PROBLEM — Z96.652 HISTORY OF TOTAL KNEE REPLACEMENT, LEFT: Status: ACTIVE | Noted: 2020-08-19

## 2020-08-19 PROCEDURE — 3017F COLORECTAL CA SCREEN DOC REV: CPT | Performed by: ORTHOPAEDIC SURGERY

## 2020-08-19 PROCEDURE — 20610 DRAIN/INJ JOINT/BURSA W/O US: CPT | Performed by: ORTHOPAEDIC SURGERY

## 2020-08-19 PROCEDURE — 89051 BODY FLUID CELL COUNT: CPT

## 2020-08-19 PROCEDURE — G8428 CUR MEDS NOT DOCUMENT: HCPCS | Performed by: ORTHOPAEDIC SURGERY

## 2020-08-19 PROCEDURE — G8420 CALC BMI NORM PARAMETERS: HCPCS | Performed by: ORTHOPAEDIC SURGERY

## 2020-08-19 PROCEDURE — 1036F TOBACCO NON-USER: CPT | Performed by: ORTHOPAEDIC SURGERY

## 2020-08-19 PROCEDURE — 99214 OFFICE O/P EST MOD 30 MIN: CPT | Performed by: ORTHOPAEDIC SURGERY

## 2020-08-19 PROCEDURE — 87070 CULTURE OTHR SPECIMN AEROBIC: CPT

## 2020-08-19 PROCEDURE — 87205 SMEAR GRAM STAIN: CPT

## 2020-08-20 LAB
APPEARANCE FLUID: NORMAL
APPEARANCE FLUID: NORMAL
CELL COUNT FLUID TYPE: NORMAL
CELL COUNT FLUID TYPE: NORMAL
COLOR FLUID: NORMAL
COLOR FLUID: NORMAL
MONOCYTE, FLUID: 32 %
MONOCYTE, FLUID: 36 %
NEUTROPHIL, FLUID: 64 %
NEUTROPHIL, FLUID: 68 %
NUCLEATED CELLS FLUID: 8085 /UL
NUCLEATED CELLS FLUID: 9352 /UL
RBC FLUID: NORMAL /UL
RBC FLUID: NORMAL /UL

## 2020-08-20 NOTE — PROGRESS NOTES
Chief Complaint:   Chief Complaint   Patient presents with    Knee Pain     Pain and swelling bilateral knees. Hx Bilateral TKA 2004 [place]. Revision Lt Knee 2008. HPI   69-year-old woman with rheumatoid arthritis. History of bilateral total knee arthroplasties done elsewhere. Subsequently under my care she has been followed with periodic recurrences of effusions of both knees that have been worked up and consulted with rheumatology. At no point with sepsis identified appear to be uncontrolled rheumatoid process. Due to refractory synovitis and pain she underwent a left knee sign of ectomy and poly-change by me which was helpful for a period of time. She is here today complaining of bilateral knee pain and swelling for about 1 year. She is on multiple rheumatoid medications including prednisone 5 mg which she increased herself yesterday to 10 mg. She denies fever or other constitutional symptoms. No other recent illness. Patient Active Problem List   Diagnosis    Rheumatoid arthritis ([address]    Abnormal EKG    Hx of total knee arthroplasty, right    History of total knee replacement, left       Past Medical History:   Diagnosis Date    Hypothyroidism     Rheumatoid arthritis ([address]        Past Surgical History:   Procedure Laterality Date    ANKLE FRACTURE SURGERY Left 2016    ORIF Lt ankle fracture    BACK SURGERY  2000's    CATARACT REMOVAL WITH IMPLANT Bilateral     May 2020, June 2020    ELBOW FRACTURE SURGERY Left 2015    Periprosthetic fracture    ELBOW SURGERY Right 1990's    Joint replacement    ELBOW SURGERY Left 1990's    Joint Replacement    HIP SURGERY Right 1990's    Rt TON    HIP SURGERY Left 1980's    Lt TON    KNEE SURGERY Bilateral 11/02/2004    Bilateral cemented Triathlon TKA w/patella resurfacing. [facility] Left 10/29/2008    Revision of left knee tibial component insert. Synovectomy left knee.   Rosalina Menjivar MD    PARTIAL STANDING; Future  -     XR KNEE LEFT (1-2 VIEWS); Future  -     XR KNEE RIGHT (1-2 VIEWS); Future    Rheumatoid arthritis involving both knees, unspecified rheumatoid factor presence (HCC)  -     XR KNEE BILATERAL STANDING; Future  -     XR KNEE LEFT (1-2 VIEWS); Future  -     XR KNEE RIGHT (1-2 VIEWS); Future    Hx of total knee arthroplasty, right  -     Body Fluid Cell Count with Differential; Future  -     Culture, Body Fluid; Future    History of total knee replacement, left  -     Body Fluid Cell Count with Differential; Future  -     Culture, Body Fluid; Future    Effusion of both knee joints  -     MS ARTHROCENTESIS ASPIR&/INJ MAJOR JT/BURSA W/O US  -     MS ARTHROCENTESIS ASPIR&/INJ MAJOR JT/BURSA W/O US  -     Body Fluid Cell Count with Differential; Future  -     Body Fluid Cell Count with Differential; Future  -     Culture, Body Fluid; Future  -     Culture, Body Fluid; Future    Discussed options with patient. Based on her prior history she is likely having a rheumatoid flare. She did wish to have both knees aspirated which is also appropriate for diagnostic purposes. Under sterile prep and local anesthetic, each knee was aspirated obtaining about 60 cc of reddish-orange somewhat turbid thin fluid. Patient tolerated the bilateral knee aspirations without difficulty. Both specimens were sent for independent cell count and cultures. Patient was advised to contact her rheumatologist for medical management of possible rheumatoid flare. She should contact me immediately if she develops increasing symptoms especially if feeling ill or fever greater than 100 degrees. Otherwise we will recheck in 1 week. Return in about 1 week (around 8/26/2020). Eliana Cooney MD    8/20/2020  8:24 AM     Addendum:  Final result   Visible to patient:  No (not released) Dx:  Effusion of both knee joints; History. ..     Ref Range & Units  1d ago  (8/19/20)  1d ago  (8/19/20)    Cell Count Fluid Type Synovial   Synovial     Color, Fluid   Pink   Pink     Appearance, Fluid   Hazy   Hazy     Nucl Cell, Fluid  /uL  8,085   9,352     RBC, Fluid  /uL  28,000   22,000     Neutrophil Count, Fluid  %  64   68 CM     Comment: Neutrophils, Body Fluid = Polymorphonuclear Leukocytes    Monocyte Count, Fluid  %  36   32 CM     Comment: Monocyte Count, Fluid includes all Mononuclear cells. Resulting Agency   [address] [place] [address] Lab  [address] [place] [address] Lab          Specimen Collected: 08/19/20 12:00  Last Resulted: 08/20/20 01:20          Lab Flowsheet       Order Details       View Encounter       Lab and Collection Details       Routing       Result History         CM=Additional comments           Body Fluid Cell Count with Differential   Order: [id number]   Status:  Final result   Visible to patient:  No (not released) Dx:  Effusion of both knee joints; Hx of t. ..   (important suggestion)   Newer results are available. Click to view them now. Ref Range & Units  1d ago   Cell Count Fluid Type   Synovial     Color, Fluid   Pink     Appearance, Fluid   Hazy     Nucl Cell, Fluid  /uL  9,352     RBC, Fluid  /uL  22,000     Neutrophil Count, Fluid  %  68     Comment: Neutrophils, Body Fluid = Polymorphonuclear Leukocytes    Monocyte Count, Fluid  %  32     Comment: Monocyte Count, Fluid includes all Mononuclear cells.     Resulting Agency   [address] Lab          Specimen Collected: 08/19/20 12:00  Last Resulted: 08/20/20 01:04

## 2020-08-25 LAB
BODY FLUID CULTURE, STERILE: NORMAL
BODY FLUID CULTURE, STERILE: NORMAL
GRAM STAIN RESULT: NORMAL
GRAM STAIN RESULT: NORMAL

## 2021-06-24 LAB
ALBUMIN SERPL-MCNC: 4 G/DL (ref 3.5–5.2)
ALP BLD-CCNC: 78 U/L (ref 35–104)
ALT SERPL-CCNC: 8 U/L (ref 0–32)
ANION GAP SERPL CALCULATED.3IONS-SCNC: 7 MMOL/L (ref 7–16)
AST SERPL-CCNC: 20 U/L (ref 0–31)
BASOPHILS ABSOLUTE: 0.03 E9/L (ref 0–0.2)
BASOPHILS RELATIVE PERCENT: 0.2 % (ref 0–2)
BILIRUB SERPL-MCNC: 0.3 MG/DL (ref 0–1.2)
BUN BLDV-MCNC: 17 MG/DL (ref 6–23)
CALCIUM SERPL-MCNC: 9.8 MG/DL (ref 8.6–10.2)
CHLORIDE BLD-SCNC: 106 MMOL/L (ref 98–107)
CO2: 28 MMOL/L (ref 22–29)
CREAT SERPL-MCNC: 0.7 MG/DL (ref 0.5–1)
EOSINOPHILS ABSOLUTE: 0.04 E9/L (ref 0.05–0.5)
EOSINOPHILS RELATIVE PERCENT: 0.3 % (ref 0–6)
GFR AFRICAN AMERICAN: >60
GFR NON-AFRICAN AMERICAN: >60 ML/MIN/1.73
GLUCOSE BLD-MCNC: 80 MG/DL (ref 74–99)
HCT VFR BLD CALC: 40.1 % (ref 34–48)
HEMOGLOBIN: 12.4 G/DL (ref 11.5–15.5)
IMMATURE GRANULOCYTES #: 0.05 E9/L
IMMATURE GRANULOCYTES %: 0.4 % (ref 0–5)
LYMPHOCYTES ABSOLUTE: 0.8 E9/L (ref 1.5–4)
LYMPHOCYTES RELATIVE PERCENT: 6.6 % (ref 20–42)
MCH RBC QN AUTO: 28.8 PG (ref 26–35)
MCHC RBC AUTO-ENTMCNC: 30.9 % (ref 32–34.5)
MCV RBC AUTO: 93 FL (ref 80–99.9)
MONOCYTES ABSOLUTE: 0.59 E9/L (ref 0.1–0.95)
MONOCYTES RELATIVE PERCENT: 4.9 % (ref 2–12)
NEUTROPHILS ABSOLUTE: 10.57 E9/L (ref 1.8–7.3)
NEUTROPHILS RELATIVE PERCENT: 87.6 % (ref 43–80)
PDW BLD-RTO: 15.3 FL (ref 11.5–15)
PLATELET # BLD: 289 E9/L (ref 130–450)
PMV BLD AUTO: 10.1 FL (ref 7–12)
POTASSIUM SERPL-SCNC: 5 MMOL/L (ref 3.5–5)
RBC # BLD: 4.31 E12/L (ref 3.5–5.5)
SODIUM BLD-SCNC: 141 MMOL/L (ref 132–146)
TOTAL PROTEIN: 7.2 G/DL (ref 6.4–8.3)
WBC # BLD: 12.1 E9/L (ref 4.5–11.5)

## 2021-07-12 RX ORDER — METHOTREXATE 2.5 MG/1
TABLET ORAL
Qty: 96 TABLET | Refills: 1 | OUTPATIENT
Start: 2021-07-12

## 2022-07-13 NOTE — PROGRESS NOTES
[facility] PRE-ADMISSION TESTING INSTRUCTIONS    The Preadmission Testing patient is instructed accordingly using the following criteria (check applicable):    ARRIVAL INSTRUCTIONS:  [x] Parking the day of Surgery is located in the Main Entrance lot. Upon entering the door, make an immediate right to the surgery reception desk    [x] Bring photo ID and insurance card    [] Bring in a copy of Living will or Durable Power of  papers. [x] Please be sure to arrange for responsible adult to provide transportation to and from the hospital    [x] Please arrange for responsible adult to be with you for the 24 hour period post procedure due to having anesthesia    [x] If you awake am of surgery not feeling well or have temperature >100 please call [phone]    GENERAL INSTRUCTIONS:    [x] Nothing by mouth after midnight, including gum, candy, mints or water    [x] You may brush your teeth, but do not swallow any water    [x] Take medications as instructed with 1-2 oz of water    [x] Stop herbal supplements and vitamins 5 days prior to procedure    [x] Follow preop dosing of blood thinners per physician instructions    [] Take 1/2 dose of evening insulin, but no insulin after midnight    [] No oral diabetic medications after midnight    [] If diabetic and have low blood sugar or feel symptomatic, take 1-2oz apple juice only    [] Bring inhalers day of surgery    [] Bring C-PAP/ Bi-Pap day of surgery    [] Bring urine specimen day of surgery    [x] Shower or bath with soap, lather and rinse well, AM of Surgery, no lotion, powders or creams to surgical site    [] Follow bowel prep as instructed per surgeon    [x] No tobacco products within 24 hours of surgery     [x] No alcohol or illegal drug use within 24 hours of surgery.     [x] Jewelry, body piercing's, eyeglasses, contact lenses and dentures are not permitted into surgery (bring cases)      [x] Please do not wear any nail polish, make up or hair products on the day of surgery    [x] You can expect a call the business day prior to procedure to notify you if your arrival time changes    [x] If you receive a survey after surgery we would greatly appreciate your comments    [] Parent/guardian of a minor must accompany their child and remain on the premises  the entire time they are under our care     [] Pediatric patients may bring favorite toy, blanket or comfort item with them    [] A caregiver or family member must remain with the patient during their stay if they are mentally handicapped, have dementia, disoriented or unable to use a call light or would be a safety concern if left unattended    [x] Please notify surgeon if you develop any illness between now and time of surgery (cold, cough, sore throat, fever, nausea, vomiting) or any signs of infections  including skin, wounds, and dental.    []  The Outpatient Pharmacy is available to fill your prescription here on your day of surgery, ask your preop nurse for details    [x] Other instructions: Wear comfortable clothing    EDUCATIONAL MATERIALS PROVIDED:    [] PAT Preoperative Education Packet/Booklet     [] Medication List    [] Transfusion bracelet applied with instructions    [] Shower with soap, lather and rinse well, and use CHG wipes provided the evening before surgery as instructed    [] Incentive spirometer with instructions

## 2022-08-03 NOTE — PROGRESS NOTES
[facility] PRE-ADMISSION TESTING INSTRUCTIONS    The Preadmission Testing patient is instructed accordingly using the following criteria (check applicable):    ARRIVAL INSTRUCTIONS:  [x] Parking the day of Surgery is located in the Main Entrance lot. Upon entering the door, make an immediate right to the surgery reception desk    [x] Bring photo ID and insurance card    [] Bring in a copy of Living will or Durable Power of  papers. [x] Please be sure to arrange for responsible adult to provide transportation to and from the hospital    [x] Please arrange for responsible adult to be with you for the 24 hour period post procedure due to having anesthesia    [x] If you awake am of surgery not feeling well or have temperature >100 please call [phone]    GENERAL INSTRUCTIONS:    [x] Nothing by mouth after midnight, including gum, candy, mints or water    [x] You may brush your teeth, but do not swallow any water    [x] Take medications as instructed with 1-2 oz of water    [x] Stop herbal supplements and vitamins 5 days prior to procedure    [] Follow preop dosing of blood thinners per physician instructions    [] Take 1/2 dose of evening insulin, but no insulin after midnight    [] No oral diabetic medications after midnight    [] If diabetic and have low blood sugar or feel symptomatic, take 1-2oz apple juice only    [] Bring inhalers day of surgery    [] Bring C-PAP/ Bi-Pap day of surgery    [] Bring urine specimen day of surgery    [x] Shower or bath with soap, lather and rinse well, AM of Surgery, no lotion, powders or creams to surgical site    [] Follow bowel prep as instructed per surgeon    [x] No tobacco products within 24 hours of surgery     [x] No alcohol or illegal drug use within 24 hours of surgery.     [x] Jewelry, body piercing's, eyeglasses, contact lenses and dentures are not permitted into surgery (bring cases)      [x] Please do not wear any nail polish,

## 2022-08-04 ENCOUNTER — ANESTHESIA EVENT (OUTPATIENT)
Dept: OPERATING ROOM | Age: 62
End: 2022-08-04
Payer: MEDICAID

## 2022-08-04 NOTE — ANESTHESIA PRE PROCEDURE
Department of Anesthesiology  Preprocedure Note       Name:  Blaine Patino   Age:  57 y.o.  :  1960                                          MRN:  [id number]         Date:  2022      Surgeon: Elaine Rowell):  Shivam Tovar DPM    Procedure: Procedure(s):  REMOVAL FIBULAR SESAMOID RIGHT FOOT    Medications prior to admission:   Prior to Admission medications    Medication Sig Start Date End Date Taking? Authorizing Provider   liothyronine (CYTOMEL) 5 MCG tablet 2 times daily Takes one in am and 1 in pm 19   Historical Provider, MD ABRAHAMIRA PEN 40 MG/0.4ML PNKT every 14 days  18   Historical Provider, MD   pramipexole (MIRAPEX) 0.25 MG tablet Take 0.25 mg by mouth nightly  18   Historical Provider, MD   folic acid (FOLVITE) 1 MG tablet Take 1 mg by mouth daily  12   Historical Provider, MD   methotrexate 2.5 MG tablet once a week Takes 8 pills (20 mg once a week on ) 12   Historical Provider, MD   predniSONE (DELTASONE) 5 MG tablet Take 5 mg by mouth daily  12   Historical Provider, MD   Calcium + D (CALTRATE) 600-200 MG-UNIT tablet Take 1 tablet by mouth daily. Historical Provider, MD   Zinc 50 MG CAPS Take by mouth Not taking    Historical Provider, MD       Current medications:    No current facility-administered medications for this encounter. Current Outpatient Medications   Medication Sig Dispense Refill    liothyronine (CYTOMEL) 5 MCG tablet 2 times daily Takes one in am and 1 in pm      HUMIRA PEN 40 MG/0.4ML PNKT every 14 days       pramipexole (MIRAPEX) 0.25 MG tablet Take 0.25 mg by mouth nightly       folic acid (FOLVITE) 1 MG tablet Take 1 mg by mouth daily       methotrexate 2.5 MG tablet once a week Takes 8 pills (20 mg once a week on )      predniSONE (DELTASONE) 5 MG tablet Take 5 mg by mouth daily       Calcium + D (CALTRATE) 600-200 MG-UNIT tablet Take 1 tablet by mouth daily.         Zinc 50 MG CAPS Take by mouth Not taking Allergies:  No Known Allergies    Problem List:    Patient Active Problem List   Diagnosis Code    Rheumatoid arthritis ([facility] [address] M06.9    Abnormal EKG R94.31    Hx of total knee arthroplasty, right Z96.651    History of total knee replacement, left Z96.652       Past Medical History:        Diagnosis Date    Hypothyroidism     Restless leg     Rheumatoid arthritis ([facility] [address]     Right foot pain        Past Surgical History:        Procedure Laterality Date    ANKLE FRACTURE SURGERY Left 2016    ORIF Lt ankle fracture    BACK SURGERY  2000's    CATARACT REMOVAL WITH IMPLANT Bilateral     May 2020, June 2020    ELBOW FRACTURE SURGERY Left 2015    Periprosthetic fracture    ELBOW SURGERY Right 1990's    Joint replacement    ELBOW SURGERY Left 1990's    Joint Replacement    HIP SURGERY Right 1990's    Rt TON    HIP SURGERY Left 1980's    Lt TON    HYSTERECTOMY (CERVIX STATUS UNKNOWN)      KNEE SURGERY Bilateral 11/02/2004    Bilateral cemented Triathlon TKA w/patella resurfacing. [facility] Left 10/29/2008    Revision of left knee tibial component insert. Synovectomy left knee. Von Buitrago MD    PARTIAL HYSTERECTOMY (CERVIX NOT REMOVED)  1999    SHOULDER ARTHROSCOPY Right     Cuff repair  [address] Bilateral     Fusions  Rt 1990's  Left 6/2020       Social History:    Social History     Tobacco Use    Smoking status: Never    Smokeless tobacco: Never   Substance Use Topics    Alcohol use:  No                                Counseling given: Not Answered      Vital Signs (Current):   Vitals:    07/13/22 1446 08/03/22 1104   Weight: 108 lb (49 kg) 109 lb (49.4 kg)   Height: 5' 5\" (1.651 m) 5' 5\" (1.651 m)                                              BP Readings from Last 3 Encounters:   03/26/20 110/60   03/19/20 (!) 146/78   02/20/20 110/66       NPO Status:                                                                                 BMI:   Wt Readings from Last 3 Encounters:   08/19/20 118 lb (53.5 kg)   03/26/20 118 lb (53.5 kg)   03/19/20 123 lb 14.4 oz (56.2 kg)     Body mass index is 18.14 kg/m². CBC:   Lab Results   Component Value Date/Time    WBC 12.1 06/24/2021 10:25 AM    RBC 4.31 06/24/2021 10:25 AM    HGB 12.4 06/24/2021 10:25 AM    HCT 40.1 06/24/2021 10:25 AM    MCV 93.0 06/24/2021 10:25 AM    RDW 15.3 06/24/2021 10:25 AM     06/24/2021 10:25 AM       CMP:   Lab Results   Component Value Date/Time     06/24/2021 10:25 AM    K 5.0 06/24/2021 10:25 AM     06/24/2021 10:25 AM    CO2 28 06/24/2021 10:25 AM    BUN 17 06/24/2021 10:25 AM    CREATININE 0.7 06/24/2021 10:25 AM    GFRAA >60 06/24/2021 10:25 AM    LABGLOM >60 06/24/2021 10:25 AM    GLUCOSE 80 06/24/2021 10:25 AM    PROT 7.2 06/24/2021 10:25 AM    CALCIUM 9.8 06/24/2021 10:25 AM    BILITOT 0.3 06/24/2021 10:25 AM    ALKPHOS 78 06/24/2021 10:25 AM    AST 20 06/24/2021 10:25 AM    ALT 8 06/24/2021 10:25 AM       POC Tests: No results for input(s): POCGLU, POCNA, POCK, POCCL, POCBUN, POCHEMO, POCHCT in the last 72 hours. Coags: No results found for: PROTIME, INR, APTT    HCG (If Applicable): No results found for: PREGTESTUR, PREGSERUM, HCG, HCGQUANT     ABGs: No results found for: PHART, PO2ART, DIR2JGH, FZT7GLV, BEART, I7JRVHGJ     Type & Screen (If Applicable):  No results found for: LABABO, LABRH    Drug/Infectious Status (If Applicable):  No results found for: HIV, HEPCAB    COVID-19 Screening (If Applicable): No results found for: COVID19        Anesthesia Evaluation    Airway:           Dental:          Pulmonary:                              Cardiovascular:                  Stress test reviewed             ROS comment: Negative stress test     Neuro/Psych:               GI/Hepatic/Renal:             Endo/Other:    (+) hypothyroidism: arthritis: rheumatoid. , .                 Abdominal:             Vascular:           Other Findings:           Anesthesia Plan      MAC     ASA 2       Induction: intravenous.   continuous noninvasive hemodynamic monitor                          Brandi Simpson MD   8/4/2022

## 2022-08-05 ENCOUNTER — HOSPITAL ENCOUNTER (OUTPATIENT)
Dept: GENERAL RADIOLOGY | Age: 62
Discharge: HOME | End: 2022-08-07
Attending: PODIATRIST
Payer: MEDICAID

## 2022-08-05 ENCOUNTER — ANESTHESIA (OUTPATIENT)
Dept: OPERATING ROOM | Age: 62
End: 2022-08-05
Payer: MEDICAID

## 2022-08-05 ENCOUNTER — HOSPITAL ENCOUNTER (OUTPATIENT)
Age: 62
Setting detail: OUTPATIENT SURGERY
Discharge: HOME | End: 2022-08-05
Attending: PODIATRIST | Admitting: PODIATRIST
Payer: MEDICAID

## 2022-08-05 VITALS
TEMPERATURE: 96.8 F | SYSTOLIC BLOOD PRESSURE: 123 MMHG | RESPIRATION RATE: 16 BRPM | WEIGHT: 108 LBS | BODY MASS INDEX: 17.99 KG/M2 | DIASTOLIC BLOOD PRESSURE: 70 MMHG | HEART RATE: 75 BPM | HEIGHT: 65 IN | OXYGEN SATURATION: 100 %

## 2022-08-05 DIAGNOSIS — M79.674 PAIN AND SWELLING OF TOE OF RIGHT FOOT: Primary | ICD-10-CM

## 2022-08-05 DIAGNOSIS — R52 PAIN: ICD-10-CM

## 2022-08-05 DIAGNOSIS — S82.831S OTHER CLOSED FRACTURE OF PROXIMAL END OF RIGHT FIBULA, SEQUELA: ICD-10-CM

## 2022-08-05 DIAGNOSIS — M79.89 PAIN AND SWELLING OF TOE OF RIGHT FOOT: Primary | ICD-10-CM

## 2022-08-05 LAB — METER GLUCOSE: 105 MG/DL (ref 74–99)

## 2022-08-05 PROCEDURE — 88304 TISSUE EXAM BY PATHOLOGIST: CPT

## 2022-08-05 PROCEDURE — 87206 SMEAR FLUORESCENT/ACID STAI: CPT

## 2022-08-05 PROCEDURE — C1713 ANCHOR/SCREW BN/BN,TIS/BN: HCPCS | Performed by: PODIATRIST

## 2022-08-05 PROCEDURE — 3209999900 FLUORO FOR SURGICAL PROCEDURES

## 2022-08-05 PROCEDURE — 7100000011 HC PHASE II RECOVERY - ADDTL 15 MIN: Performed by: PODIATRIST

## 2022-08-05 PROCEDURE — 3600000003 HC SURGERY LEVEL 3 BASE: Performed by: PODIATRIST

## 2022-08-05 PROCEDURE — 82962 GLUCOSE BLOOD TEST: CPT

## 2022-08-05 PROCEDURE — 87102 FUNGUS ISOLATION CULTURE: CPT

## 2022-08-05 PROCEDURE — 3600000013 HC SURGERY LEVEL 3 ADDTL 15MIN: Performed by: PODIATRIST

## 2022-08-05 PROCEDURE — 88311 DECALCIFY TISSUE: CPT

## 2022-08-05 PROCEDURE — 2580000003 HC RX 258: Performed by: PODIATRIST

## 2022-08-05 PROCEDURE — 88304 TISSUE EXAM BY PATHOLOGIST: CPT | Performed by: ANESTHESIOLOGY

## 2022-08-05 PROCEDURE — 7100000010 HC PHASE II RECOVERY - FIRST 15 MIN: Performed by: PODIATRIST

## 2022-08-05 PROCEDURE — 87070 CULTURE OTHR SPECIMN AEROBIC: CPT

## 2022-08-05 PROCEDURE — 87205 SMEAR GRAM STAIN: CPT

## 2022-08-05 PROCEDURE — 2580000003 HC RX 258

## 2022-08-05 PROCEDURE — 6360000002 HC RX W HCPCS

## 2022-08-05 PROCEDURE — 2709999900 HC NON-CHARGEABLE SUPPLY: Performed by: PODIATRIST

## 2022-08-05 PROCEDURE — 2500000003 HC RX 250 WO HCPCS

## 2022-08-05 PROCEDURE — 3700000000 HC ANESTHESIA ATTENDED CARE: Performed by: PODIATRIST

## 2022-08-05 PROCEDURE — 87075 CULTR BACTERIA EXCEPT BLOOD: CPT

## 2022-08-05 PROCEDURE — 3700000001 HC ADD 15 MINUTES (ANESTHESIA): Performed by: PODIATRIST

## 2022-08-05 PROCEDURE — 87015 SPECIMEN INFECT AGNT CONCNTJ: CPT

## 2022-08-05 PROCEDURE — 6360000002 HC RX W HCPCS: Performed by: PODIATRIST

## 2022-08-05 PROCEDURE — 87116 MYCOBACTERIA CULTURE: CPT

## 2022-08-05 PROCEDURE — 2500000003 HC RX 250 WO HCPCS: Performed by: PODIATRIST

## 2022-08-05 DEVICE — GRAFT HUM TISS 2X4CM 0.4-1MM THN REGENERATIVE TISS MTRX: Type: IMPLANTABLE DEVICE | Site: FOOT | Status: FUNCTIONAL

## 2022-08-05 RX ORDER — MIDAZOLAM HYDROCHLORIDE 1 MG/ML
INJECTION INTRAMUSCULAR; INTRAVENOUS PRN
Status: DISCONTINUED | OUTPATIENT
Start: 2022-08-05 | End: 2022-08-05 | Stop reason: SDUPTHER

## 2022-08-05 RX ORDER — FENTANYL CITRATE 50 UG/ML
INJECTION, SOLUTION INTRAMUSCULAR; INTRAVENOUS PRN
Status: DISCONTINUED | OUTPATIENT
Start: 2022-08-05 | End: 2022-08-05 | Stop reason: SDUPTHER

## 2022-08-05 RX ORDER — SODIUM CHLORIDE 9 MG/ML
INJECTION, SOLUTION INTRAVENOUS PRN
Status: DISCONTINUED | OUTPATIENT
Start: 2022-08-05 | End: 2022-08-05 | Stop reason: HOSPADM

## 2022-08-05 RX ORDER — PROPOFOL 10 MG/ML
INJECTION, EMULSION INTRAVENOUS CONTINUOUS PRN
Status: DISCONTINUED | OUTPATIENT
Start: 2022-08-05 | End: 2022-08-05 | Stop reason: SDUPTHER

## 2022-08-05 RX ORDER — BUPIVACAINE HYDROCHLORIDE 5 MG/ML
INJECTION, SOLUTION EPIDURAL; INTRACAUDAL PRN
Status: DISCONTINUED | OUTPATIENT
Start: 2022-08-05 | End: 2022-08-05 | Stop reason: ALTCHOICE

## 2022-08-05 RX ORDER — ONDANSETRON 2 MG/ML
INJECTION INTRAMUSCULAR; INTRAVENOUS PRN
Status: DISCONTINUED | OUTPATIENT
Start: 2022-08-05 | End: 2022-08-05 | Stop reason: SDUPTHER

## 2022-08-05 RX ORDER — DOXYCYCLINE HYCLATE 100 MG
100 TABLET ORAL 2 TIMES DAILY
Qty: 14 TABLET | Refills: 0 | Status: SHIPPED | OUTPATIENT
Start: 2022-08-05 | End: 2022-08-12

## 2022-08-05 RX ORDER — HYDROCODONE BITARTRATE AND ACETAMINOPHEN 5; 325 MG/1; MG/1
1 TABLET ORAL EVERY 4 HOURS PRN
Qty: 30 TABLET | Refills: 0 | Status: SHIPPED | OUTPATIENT
Start: 2022-08-05 | End: 2022-08-10

## 2022-08-05 RX ORDER — KETOROLAC TROMETHAMINE 30 MG/ML
INJECTION, SOLUTION INTRAMUSCULAR; INTRAVENOUS PRN
Status: DISCONTINUED | OUTPATIENT
Start: 2022-08-05 | End: 2022-08-05 | Stop reason: SDUPTHER

## 2022-08-05 RX ORDER — SODIUM CHLORIDE 0.9 % (FLUSH) 0.9 %
5-40 SYRINGE (ML) INJECTION EVERY 12 HOURS SCHEDULED
Status: DISCONTINUED | OUTPATIENT
Start: 2022-08-05 | End: 2022-08-05 | Stop reason: HOSPADM

## 2022-08-05 RX ORDER — GLYCOPYRROLATE 0.2 MG/ML
INJECTION INTRAMUSCULAR; INTRAVENOUS PRN
Status: DISCONTINUED | OUTPATIENT
Start: 2022-08-05 | End: 2022-08-05 | Stop reason: SDUPTHER

## 2022-08-05 RX ORDER — SODIUM CHLORIDE 9 MG/ML
INJECTION, SOLUTION INTRAVENOUS CONTINUOUS PRN
Status: DISCONTINUED | OUTPATIENT
Start: 2022-08-05 | End: 2022-08-05 | Stop reason: SDUPTHER

## 2022-08-05 RX ORDER — DEXAMETHASONE SODIUM PHOSPHATE 4 MG/ML
INJECTION, SOLUTION INTRA-ARTICULAR; INTRALESIONAL; INTRAMUSCULAR; INTRAVENOUS; SOFT TISSUE PRN
Status: DISCONTINUED | OUTPATIENT
Start: 2022-08-05 | End: 2022-08-05 | Stop reason: SDUPTHER

## 2022-08-05 RX ORDER — SODIUM CHLORIDE 0.9 % (FLUSH) 0.9 %
5-40 SYRINGE (ML) INJECTION PRN
Status: DISCONTINUED | OUTPATIENT
Start: 2022-08-05 | End: 2022-08-05 | Stop reason: HOSPADM

## 2022-08-05 RX ADMIN — ONDANSETRON 4 MG: 2 INJECTION INTRAMUSCULAR; INTRAVENOUS at 13:40

## 2022-08-05 RX ADMIN — PROPOFOL 75 MCG/KG/MIN: 10 INJECTION, EMULSION INTRAVENOUS at 13:55

## 2022-08-05 RX ADMIN — FENTANYL CITRATE 50 MCG: 50 INJECTION, SOLUTION INTRAMUSCULAR; INTRAVENOUS at 13:55

## 2022-08-05 RX ADMIN — FENTANYL CITRATE 25 MCG: 50 INJECTION, SOLUTION INTRAMUSCULAR; INTRAVENOUS at 14:36

## 2022-08-05 RX ADMIN — GLYCOPYRROLATE 0.1 MG: 0.2 INJECTION, SOLUTION INTRAMUSCULAR; INTRAVENOUS at 13:40

## 2022-08-05 RX ADMIN — KETOROLAC TROMETHAMINE 30 MG: 30 INJECTION, SOLUTION INTRAMUSCULAR; INTRAVENOUS at 14:46

## 2022-08-05 RX ADMIN — SODIUM CHLORIDE: 9 INJECTION, SOLUTION INTRAVENOUS at 13:49

## 2022-08-05 RX ADMIN — MIDAZOLAM 2 MG: 1 INJECTION INTRAMUSCULAR; INTRAVENOUS at 13:40

## 2022-08-05 RX ADMIN — DEXAMETHASONE SODIUM PHOSPHATE 10 MG: 4 INJECTION, SOLUTION INTRAMUSCULAR; INTRAVENOUS at 13:40

## 2022-08-05 RX ADMIN — CEFAZOLIN 2000 MG: 1 INJECTION, POWDER, FOR SOLUTION INTRAMUSCULAR; INTRAVENOUS at 14:01

## 2022-08-05 ASSESSMENT — PAIN - FUNCTIONAL ASSESSMENT: PAIN_FUNCTIONAL_ASSESSMENT: 0-10

## 2022-08-05 NOTE — ANESTHESIA PRE PROCEDURE
Department of Anesthesiology  Preprocedure Note       Name:  Dylan Sherman   Age:  57 y.o.  :  1960                                          MRN:  [id number]         Date:  2022      Surgeon: Yehuda Marrero):  Dre Ma DPM    Procedure: Procedure(s):  REMOVAL FIBULAR SESAMOID RIGHT FOOT    Medications prior to admission:   Prior to Admission medications    Medication Sig Start Date End Date Taking? Authorizing Provider   liothyronine (CYTOMEL) 5 MCG tablet 2 times daily Takes one in am and 1 in pm 19   Historical Provider, MD ALVARADO PEN 40 MG/0.4ML PNKT every 14 days  18   Historical Provider, MD   pramipexole (MIRAPEX) 0.25 MG tablet Take 0.25 mg by mouth nightly  18   Historical Provider, MD   folic acid (FOLVITE) 1 MG tablet Take 1 mg by mouth daily  12   Historical Provider, MD   methotrexate 2.5 MG tablet once a week Takes 8 pills (20 mg once a week on ) 12   Historical Provider, MD   predniSONE (DELTASONE) 5 MG tablet Take 5 mg by mouth daily  12   Historical Provider, MD   Calcium + D (CALTRATE) 600-200 MG-UNIT tablet Take 1 tablet by mouth daily. Historical Provider, MD   Zinc 50 MG CAPS Take by mouth Not taking    Historical Provider, MD       Current medications:    No current facility-administered medications for this visit. No current outpatient medications on file.      Facility-Administered Medications Ordered in Other Visits   Medication Dose Route Frequency Provider Last Rate Last Admin    sodium chloride flush 0.9 % injection 5-40 mL  5-40 mL IntraVENous 2 times per day Dre Ma, DPM        sodium chloride flush 0.9 % injection 5-40 mL  5-40 mL IntraVENous PRN Dre Ma, DPM        0.9 % sodium chloride infusion   IntraVENous PRN Dre Ma DPM        ceFAZolin (ANCEF) 1,000 mg in sterile water 10 mL IV syringe  1,000 mg IntraVENous On Call to [address] [place] [address], CARLYN           Allergies:  No Known Allergies    Problem List:    Patient Active Problem List   Diagnosis Code    Rheumatoid arthritis ([facility] [address] M06.9    Abnormal EKG R94.31    Hx of total knee arthroplasty, right Z96.651    History of total knee replacement, left Z96.652       Past Medical History:        Diagnosis Date    Hypothyroidism     Restless leg     Rheumatoid arthritis ([facility] [address]     Right foot pain        Past Surgical History:        Procedure Laterality Date    ANKLE FRACTURE SURGERY Left 2016    ORIF Lt ankle fracture    BACK SURGERY  2000's    CATARACT REMOVAL WITH IMPLANT Bilateral     May 2020, June 2020    ELBOW FRACTURE SURGERY Left 2015    Periprosthetic fracture    ELBOW SURGERY Right 1990's    Joint replacement    ELBOW SURGERY Left 1990's    Joint Replacement    HIP SURGERY Right 1990's    Rt TON    HIP SURGERY Left 1980's    Lt TON    HYSTERECTOMY (CERVIX STATUS UNKNOWN)      KNEE SURGERY Bilateral 11/02/2004    Bilateral cemented Triathlon TKA w/patella resurfacing. [facility] Left 10/29/2008    Revision of left knee tibial component insert. Synovectomy left knee. Mary Damon MD    PARTIAL HYSTERECTOMY (CERVIX NOT REMOVED)  1999    SHOULDER ARTHROSCOPY Right     Cuff repair  [address] Bilateral     Fusions  Rt 1990's  Left 6/2020       Social History:    Social History     Tobacco Use    Smoking status: Never    Smokeless tobacco: Never   Substance Use Topics    Alcohol use: No                                Counseling given: Not Answered      Vital Signs (Current): There were no vitals filed for this visit.                                            BP Readings from Last 3 Encounters:   08/05/22 (!) 142/73   03/26/20 110/60   03/19/20 (!) 146/78       NPO Status:                                                                                 BMI:   Wt Readings from Last 3 Encounters:   08/05/22 108 lb (49 kg)   08/19/20 118 lb (53.5 kg)   03/26/20 118 lb (53.5 kg)     There is no height or weight on file to calculate BMI.    CBC:   Lab Results   Component Value Date/Time    WBC 12.1 06/24/2021 10:25 AM    RBC 4.31 06/24/2021 10:25 AM    HGB 12.4 06/24/2021 10:25 AM    HCT 40.1 06/24/2021 10:25 AM    MCV 93.0 06/24/2021 10:25 AM    RDW 15.3 06/24/2021 10:25 AM     06/24/2021 10:25 AM       CMP:   Lab Results   Component Value Date/Time     06/24/2021 10:25 AM    K 5.0 06/24/2021 10:25 AM     06/24/2021 10:25 AM    CO2 28 06/24/2021 10:25 AM    BUN 17 06/24/2021 10:25 AM    CREATININE 0.7 06/24/2021 10:25 AM    GFRAA >60 06/24/2021 10:25 AM    LABGLOM >60 06/24/2021 10:25 AM    GLUCOSE 80 06/24/2021 10:25 AM    PROT 7.2 06/24/2021 10:25 AM    CALCIUM 9.8 06/24/2021 10:25 AM    BILITOT 0.3 06/24/2021 10:25 AM    ALKPHOS 78 06/24/2021 10:25 AM    AST 20 06/24/2021 10:25 AM    ALT 8 06/24/2021 10:25 AM       POC Tests: No results for input(s): POCGLU, POCNA, POCK, POCCL, POCBUN, POCHEMO, POCHCT in the last 72 hours. Coags: No results found for: PROTIME, INR, APTT    HCG (If Applicable): No results found for: PREGTESTUR, PREGSERUM, HCG, HCGQUANT     ABGs: No results found for: PHART, PO2ART, YXI7AHH, BEK4RCW, BEART, D3TAXAJT     Type & Screen (If Applicable):  No results found for: LABABO, LABRH    Drug/Infectious Status (If Applicable):  No results found for: HIV, HEPCAB    COVID-19 Screening (If Applicable): No results found for: COVID19        Anesthesia Evaluation    Airway: Mallampati: I  TM distance: >3 FB     Mouth opening: > = 3 FB   Dental: normal exam         Pulmonary:normal exam                               Cardiovascular:                  Stress test reviewed             ROS comment: Negative stress test     Neuro/Psych:               GI/Hepatic/Renal:             Endo/Other:    (+) hypothyroidism: arthritis: rheumatoid. , .                 Abdominal:             Vascular:           Other Findings:             Anesthesia Plan      MAC ASA 2       Induction: intravenous.   continuous noninvasive hemodynamic monitor    Anesthetic plan and risks discussed with patient.                h&p reviewed, patient examined , no changes        Genovevacristo Howell MD   8/5/2022

## 2022-08-05 NOTE — DISCHARGE INSTRUCTIONS
Rest, elevate, offload right foot  Heel walking with surgical shoe right  Dispense surgical shoe  Dispense meds  F/U next week dr [facility] office  Any questions/ problems contact dr Lennox Flakes   D/C per anesthesia  Keep dressing c/d/I, do not remove

## 2022-08-05 NOTE — OP NOTE
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 Application of Graft Jacket Right Foot     Surgeon(s):  Jacinto Mittal    Assistant:   Resident: David Mckeon DPM    Anesthesia: Monitor Anesthesia Care, 10 cc of 0.5% Marcaine plain administered Mello block fashion foot preoperatively    Estimated Blood Loss (mL): Minimal    Complications: None    Specimens:   ID Type Source Tests Collected by Time Destination   1 : Right foot tissue Tissue Tissue CULTURE, ANAEROBIC, CULTURE, FUNGUS, GRAM STAIN, CULTURE, SURGICAL, CULTURE WITH SMEAR, ACID FAST BACILLIUS Jacinto Mittal 8/5/2022 1419    A : right foot tissue Tissue Tissue SURGICAL PATHOLOGY Vandana Nelson DPM 8/5/2022 1417    B : bone right foot Bone Bone SURGICAL PATHOLOGY Jacinto Mittal 8/5/2022 1430        Implants:  Implant Name Type Inv. Item Serial No.  Lot No. LRB No. Used Action   GRAFT HUM TISS 2X4CM 0.4-1MM THN REGENERATIVE TISS MTRX - [id number]  GRAFT HUM TISS 2X4CM 0.4-1MM THN REGENERATIVE TISS [facility] [employer]- [id number] Right 1 Implanted         Drains: * No LDAs found *    Findings: Yellow adipose soft tissue mass right foot superficial to fibular sesamoid. Atrophic degenerative changes to fibular sesamoid cartilage. See below for details    Indications for procedure: Patient is a 60-year-old female with a cavus foot deformity who has a longstanding complaint of pain subfirst tarsal phalangeal joint of the right foot. She has attempted all conservative therapy including offloading, taping, splinting, inserts, modifications to shoe gear. She has failed all conservative therapy and has persistent pain.   She would like to proceed forward surgical intervention at this time. All risk and benefits of the procedure were explained in detail prior to the case including not limited to excessive pain, excessive bleeding, failure of graft, infection, fracture, limb loss. Patient acknowledged and understood all risks involved with procedure and agreed to proceed forward at this time. No guarantees were made regarding the outcome of the    Detailed Description of Procedure: On August 5, 212 this 65-year-old female was transported from the hospital preoperative holding area to the operating room where she was placed on the operating table in supine position. A timeout was performed and all in attendance were in agreement correct limb and procedure. Following the induction of IV sedation a preoperative local anesthetic block was administered in a Mello block fashion to the patient's right foot consisting of 10 cc of 0.5% Marcaine plain. A pneumatic ankle tourniquet was then applied to well-padded sites proximal to the malleoli of the right foot. Right foot was then prepped and draped in usual sterile fashion and elevated above the horizontal plane. The pneumatic ankle tourniquet was then inflated to 250mmHg and the foot was return back to the operative table with the following procedure was performed:    Fibular sesamoidectomy with application of Graft Jacket Tissue matrix:  Attention was then directed towards the plantar aspect of the right first metatarsal where preoperatively a painful soft tissue prominence was noted just superficial to the fibular sesamoid. Using sterile fluoroscopy the fibular sesamoid was identified and marked. A skin marker was then used to plan the incision which was a 3 cm Lazy S incision just lateral to the fibular sesamoid. Using a 15 blade the skin incision was made down to the layer of subcutaneous tissue along the marked incision.   All bleeders were electrocoagulated and cut as encountered and as necessary. The incision was then carried through the deep tissue down to the level of capsule using a combination of blunt and sharp dissection. At this stage approximately 3 cc of 70 fluid adipose discharge was extirpated from the joint and was collected and sent for analysis. Using pickups and Mello scissors the fibular sesamoid was then identified and released from all surrounding soft tissue. The sesamoid was then extirpated in toto and passed in the field to be sent for analysis. The sesamoid was degenerative and hypertrophic in appearance with the cartilaginous surface noted to be tan, yellow, and dystrophic. The flexor hallucis brevis muscle tendon was noted to be intact and there was no damage noted to the surrounding neurovascular structures. The site was then flushed with copious muscle sterile saline solution under C-arm fluoroscopy image was obtained to ensure entire fibular sesamoid was removed in total with no remnant osseous fragments were noted. The Neely Graft Jacket 2x4cm tissue matrix was then sutured between the subcutaneous layer and the adipose layer of tissue using 3-0 Vicryl in a simple and box stitch suture fashion. There was no kinking of the graft noted and any excess graft was folded in a vertical structure to allow complete apposition within the tissue. Satisfactory application of graft the subcutaneous tissues were coapted using 3-0 Vicryl in a box suture fashion. The skin was then were coapted using 3-0 nylon in a combination horizontal mattress and simple erupted suture fashion. A final C-arm fluoroscopy image was obtained at this time with complete extirpation of the fibular sesamoid noted. The pneumatic ankle tourniquet was then rapidly deflated and immediate capillary fill time was noted to all patient's digits of the right lower extremity.   The site was then cleansed with a wet and dry towel and attention was directed towards bandaging which consisted of Xeroform, 4 x 4 gauze, Kiran, Ace bandage and a noncompressive fashion. Patient was then awoken from anesthesia and transported from the operating room to the postanesthesia care unit with all vital signs stable and in apparent distress. All neurovascular status was noted to be intact of the patient's bilateral lower extremities. Following a brief period of postoperative monitoring the patient will be discharged home with instructions for nonweightbearing to the right lower extremity in a surgical shoe at all times. She is to take all prescriptions as dispensed. She is to keep the dressings clean dry and intact at all times. She is to follow-up with Dr. Hollie Tapia at the scheduled follow-up appointment.       Electronically signed by Fabián Jimenez DPM on 8/5/2022 at 2:57 PM

## 2022-08-05 NOTE — ANESTHESIA PRE PROCEDURE
Department of Anesthesiology  Preprocedure Note       Name:  Destiny Mcclendon   Age:  57 y.o.  :  1960                                          MRN:  [id number]         Date:  2022      Surgeon: Nikky Wilks):  Enriqueta Baires DPM    Procedure: Procedure(s):  REMOVAL FIBULAR SESAMOID RIGHT FOOT    Medications prior to admission:   Prior to Admission medications    Medication Sig Start Date End Date Taking? Authorizing Provider   liothyronine (CYTOMEL) 5 MCG tablet 2 times daily Takes one in am and 1 in pm 19   Historical Provider, MD ALVARADO PEN 40 MG/0.4ML PNKT every 14 days  18   Historical Provider, MD   pramipexole (MIRAPEX) 0.25 MG tablet Take 0.25 mg by mouth nightly  18   Historical Provider, MD   folic acid (FOLVITE) 1 MG tablet Take 1 mg by mouth daily  12   Historical Provider, MD   methotrexate 2.5 MG tablet once a week Takes 8 pills (20 mg once a week on ) 12   Historical Provider, MD   predniSONE (DELTASONE) 5 MG tablet Take 5 mg by mouth daily  12   Historical Provider, MD   Calcium + D (CALTRATE) 600-200 MG-UNIT tablet Take 1 tablet by mouth daily. Historical Provider, MD   Zinc 50 MG CAPS Take by mouth Not taking    Historical Provider, MD       Current medications:    No current facility-administered medications for this visit. No current outpatient medications on file.      Facility-Administered Medications Ordered in Other Visits   Medication Dose Route Frequency Provider Last Rate Last Admin    sodium chloride flush 0.9 % injection 5-40 mL  5-40 mL IntraVENous 2 times per day Cloyde Nathaliay, DPM        sodium chloride flush 0.9 % injection 5-40 mL  5-40 mL IntraVENous PRN Cloyde Nathaliay, DPM        0.9 % sodium chloride infusion   IntraVENous PRN Cloyde Viry, DPM        ceFAZolin (ANCEF) 1,000 mg in sterile water 10 mL IV syringe  1,000 mg IntraVENous On Call to [address] [place] [address], CARLYN           Allergies:  No Known Allergies    Problem List:    Patient Active Problem List   Diagnosis Code    Rheumatoid arthritis ([facility] [address] M06.9    Abnormal EKG R94.31    Hx of total knee arthroplasty, right Z96.651    History of total knee replacement, left Z96.652       Past Medical History:        Diagnosis Date    Hypothyroidism     Restless leg     Rheumatoid arthritis ([facility] [address]     Right foot pain        Past Surgical History:        Procedure Laterality Date    ANKLE FRACTURE SURGERY Left 2016    ORIF Lt ankle fracture    BACK SURGERY  2000's    CATARACT REMOVAL WITH IMPLANT Bilateral     May 2020, June 2020    ELBOW FRACTURE SURGERY Left 2015    Periprosthetic fracture    ELBOW SURGERY Right 1990's    Joint replacement    ELBOW SURGERY Left 1990's    Joint Replacement    HIP SURGERY Right 1990's    Rt TON    HIP SURGERY Left 1980's    Lt TON    HYSTERECTOMY (CERVIX STATUS UNKNOWN)      KNEE SURGERY Bilateral 11/02/2004    Bilateral cemented Triathlon TKA w/patella resurfacing. [facility] Left 10/29/2008    Revision of left knee tibial component insert. Synovectomy left knee. Andria Hunter MD    PARTIAL HYSTERECTOMY (CERVIX NOT REMOVED)  1999    SHOULDER ARTHROSCOPY Right     Cuff repair  [address] Bilateral     Fusions  Rt 1990's  Left 6/2020       Social History:    Social History     Tobacco Use    Smoking status: Never    Smokeless tobacco: Never   Substance Use Topics    Alcohol use: No                                Counseling given: Not Answered      Vital Signs (Current): There were no vitals filed for this visit.                                            BP Readings from Last 3 Encounters:   08/05/22 (!) 142/73   03/26/20 110/60   03/19/20 (!) 146/78       NPO Status:                                                                                 BMI:   Wt Readings from Last 3 Encounters:   08/05/22 108 lb (49 kg)   08/19/20 118 lb (53.5 kg)   03/26/20 118 lb (53.5 kg)     There is no height or weight on file to calculate BMI.    CBC:   Lab Results   Component Value Date/Time    WBC 12.1 06/24/2021 10:25 AM    RBC 4.31 06/24/2021 10:25 AM    HGB 12.4 06/24/2021 10:25 AM    HCT 40.1 06/24/2021 10:25 AM    MCV 93.0 06/24/2021 10:25 AM    RDW 15.3 06/24/2021 10:25 AM     06/24/2021 10:25 AM       CMP:   Lab Results   Component Value Date/Time     06/24/2021 10:25 AM    K 5.0 06/24/2021 10:25 AM     06/24/2021 10:25 AM    CO2 28 06/24/2021 10:25 AM    BUN 17 06/24/2021 10:25 AM    CREATININE 0.7 06/24/2021 10:25 AM    GFRAA >60 06/24/2021 10:25 AM    LABGLOM >60 06/24/2021 10:25 AM    GLUCOSE 80 06/24/2021 10:25 AM    PROT 7.2 06/24/2021 10:25 AM    CALCIUM 9.8 06/24/2021 10:25 AM    BILITOT 0.3 06/24/2021 10:25 AM    ALKPHOS 78 06/24/2021 10:25 AM    AST 20 06/24/2021 10:25 AM    ALT 8 06/24/2021 10:25 AM       POC Tests: No results for input(s): POCGLU, POCNA, POCK, POCCL, POCBUN, POCHEMO, POCHCT in the last 72 hours. Coags: No results found for: PROTIME, INR, APTT    HCG (If Applicable): No results found for: PREGTESTUR, PREGSERUM, HCG, HCGQUANT     ABGs: No results found for: PHART, PO2ART, SSX5INM, PXY4DWR, BEART, F8LKHUUO     Type & Screen (If Applicable):  No results found for: LABABO, LABRH    Drug/Infectious Status (If Applicable):  No results found for: HIV, HEPCAB    COVID-19 Screening (If Applicable): No results found for: COVID19        Anesthesia Evaluation    Airway: Mallampati: I  TM distance: >3 FB     Mouth opening: > = 3 FB   Dental: normal exam         Pulmonary:normal exam                               Cardiovascular:                  Stress test reviewed             ROS comment: Negative stress test     Neuro/Psych:               GI/Hepatic/Renal:             Endo/Other:    (+) hypothyroidism: arthritis: rheumatoid. , .                 Abdominal:             Vascular:           Other Findings:             Anesthesia Plan      MAC ASA 2       Induction: intravenous. continuous noninvasive hemodynamic monitor    Anesthetic plan and risks discussed with patient.                         Serene Goldman MD   8/5/2022

## 2022-08-05 NOTE — ANESTHESIA PRE PROCEDURE
Department of Anesthesiology  Preprocedure Note       Name:  Daniel Coker   Age:  57 y.o.  :  1960                                          MRN:  [id number]         Date:  2022      Surgeon: Meg Guthrie):  Ajay Queen DPM    Procedure: Procedure(s):  REMOVAL FIBULAR SESAMOID RIGHT FOOT    Medications prior to admission:   Prior to Admission medications    Medication Sig Start Date End Date Taking? Authorizing Provider   liothyronine (CYTOMEL) 5 MCG tablet 2 times daily Takes one in am and 1 in pm 19   Historical Provider, MD ALVARADO PEN 40 MG/0.4ML PNKT every 14 days  18   Historical Provider, MD   pramipexole (MIRAPEX) 0.25 MG tablet Take 0.25 mg by mouth nightly  18   Historical Provider, MD   folic acid (FOLVITE) 1 MG tablet Take 1 mg by mouth daily  12   Historical Provider, MD   methotrexate 2.5 MG tablet once a week Takes 8 pills (20 mg once a week on ) 12   Historical Provider, MD   predniSONE (DELTASONE) 5 MG tablet Take 5 mg by mouth daily  12   Historical Provider, MD   Calcium + D (CALTRATE) 600-200 MG-UNIT tablet Take 1 tablet by mouth daily.       Historical Provider, MD   Zinc 50 MG CAPS Take by mouth Not taking    Historical Provider, MD       Current medications:    Current Facility-Administered Medications   Medication Dose Route Frequency Provider Last Rate Last Admin    sodium chloride flush 0.9 % injection 5-40 mL  5-40 mL IntraVENous 2 times per day Ajay Queen DPM        sodium chloride flush 0.9 % injection 5-40 mL  5-40 mL IntraVENous PRN Ajay Queen DPM        0.9 % sodium chloride infusion   IntraVENous PRN Ajay Queen DPM        ceFAZolin (ANCEF) 1,000 mg in sterile water 10 mL IV syringe  1,000 mg IntraVENous On Call to [address] [place] [address], [facility]           Allergies:  No Known Allergies    Problem List:    Patient Active Problem List   Diagnosis Code    Rheumatoid arthritis ([facility] [address] M06.9    Abnormal EKG R94.31    Hx of total knee arthroplasty, right Z96.651    History of total knee replacement, left Z96.652       Past Medical History:        Diagnosis Date    Hypothyroidism     Restless leg     Rheumatoid arthritis ([address]     Right foot pain        Past Surgical History:        Procedure Laterality Date    ANKLE FRACTURE SURGERY Left 2016    ORIF Lt ankle fracture    BACK SURGERY  2000's    CATARACT REMOVAL WITH IMPLANT Bilateral     May 2020, June 2020    ELBOW FRACTURE SURGERY Left 2015    Periprosthetic fracture    ELBOW SURGERY Right 1990's    Joint replacement    ELBOW SURGERY Left 1990's    Joint Replacement    HIP SURGERY Right 1990's    Rt TON    HIP SURGERY Left 1980's    Lt TON    HYSTERECTOMY (CERVIX STATUS UNKNOWN)      KNEE SURGERY Bilateral 11/02/2004    Bilateral cemented Triathlon TKA w/patella resurfacing. [facility] Left 10/29/2008    Revision of left knee tibial component insert. Synovectomy left knee. Jeane Menard MD    PARTIAL HYSTERECTOMY (CERVIX NOT REMOVED)  1999    SHOULDER ARTHROSCOPY Right     Cuff repair  [address] Bilateral     Fusions  Rt 1990's  Left 6/2020       Social History:    Social History     Tobacco Use    Smoking status: Never    Smokeless tobacco: Never   Substance Use Topics    Alcohol use:  No                                Counseling given: Not Answered      Vital Signs (Current):   Vitals:    07/13/22 1446 08/03/22 1104 08/05/22 1158   BP:   (!) 142/73   Pulse:   68   Resp:   16   Temp:   36.4 °C (97.5 °F)   TempSrc:   Temporal   SpO2:   100%   Weight: 108 lb (49 kg) 109 lb (49.4 kg) 108 lb (49 kg)   Height: 5' 5\" (1.651 m) 5' 5\" (1.651 m) 5' 5\" (1.651 m)                                              BP Readings from Last 3 Encounters:   08/05/22 (!) 142/73   03/26/20 110/60   03/19/20 (!) 146/78       NPO Status: Time of last liquid consumption: 1900                        Time of last solid

## 2022-08-05 NOTE — BRIEF OP NOTE
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), Painful soft tissue mass Right foot    Post-Op Diagnosis: Same       Procedure(s):  REMOVAL FIBULAR SESAMOID RIGHT FOOT, APPLICATION OF VANGIE GRAFT JACKET TISSUE MATRIX    Surgeon(s):  Jeaneth Tafoya DPM    Assistant:  Resident: Rosalie Prakash DPM    Anesthesia: Monitor Anesthesia Care, 10 cc of 0.5% Marcaine plain administered Mello block fashion foot preoperatively    Estimated Blood Loss (mL): Minimal    Complications: None    Specimens:   ID Type Source Tests Collected by Time Destination   1 : Right foot tissue Tissue Tissue CULTURE, ANAEROBIC, CULTURE, FUNGUS, GRAM STAIN, CULTURE, SURGICAL, CULTURE WITH SMEAR, ACID FAST BACILLIUS Jacinto Perales 8/5/2022 1419    A : right foot tissue Tissue Tissue SURGICAL PATHOLOGY Jeaneth Tafoya DPM 8/5/2022 1417    B : bone right foot Bone Bone SURGICAL PATHOLOGY Jacinto Perales 8/5/2022 1430        Implants:  Implant Name Type Inv. Item Serial No.  Lot No. LRB No. Used Action   GRAFT HUM TISS 2X4CM 0.4-1MM THN REGENERATIVE TISS MTRX - [id number]  GRAFT HUM TISS 2X4CM 0.4-1MM THN REGENERATIVE TISS [facility] [employer]- [id number] Right 1 Implanted         Drains: * No LDAs found *    Findings: Yellow adipose soft tissue mass right foot superficial to fibular sesamoid. Atrophic degenerative changes to fibular sesamoid cartilage.      Electronically signed by Rosalie Prakash DPM on 8/5/2022 at 2:53 PM

## 2022-08-05 NOTE — BRIEF OP NOTE
Brief Postoperative Note      Patient: Ashley Richmond  YOB: 1960  MRN: [id number]    Date of Procedure: 8/5/2022    Pre-Op Diagnosis: Fibular sesamoid fracture right foot. Pain right foot. Cyst Right Foot   Post-Op Diagnosis: Same       Procedure(s):  REMOVAL FIBULAR SESAMOID RIGHT FOOT with graft jacket application. Removal of Cyst right foot.       Surgeon(s):  Bre Mancia DPM    Assistant:Dr Gerald Solano  Resident: (Unknown)    Anesthesia: Monitor Anesthesia Care    Estimated Blood Loss (mL): Minimal    Complications: None    Specimens: Bone, Tissue  * No specimens in log *    Implants: Graft  * No surgical log found *      Drains: * No LDAs found *    Findings: bone fractured, Cyst right foot    Electronically signed by Bre Mancia DPM on 8/5/2022 at 8:50 AM

## 2022-08-06 LAB — GRAM STAIN ORDERABLE: NORMAL

## 2022-08-07 NOTE — ANESTHESIA POSTPROCEDURE EVALUATION
Department of Anesthesiology  Postprocedure Note    Patient: Alexandria Jeff  MRN: [id number]  YOB: 1960  Date of evaluation: 8/7/2022      Procedure Summary     Date: 08/05/22 Room / Location: [facility] OR 09 / [facility]    Anesthesia Start: 0810 Anesthesia Stop: 2241    Procedure: REMOVAL FIBULAR SESAMOID RIGHT FOOT (Right: Foot) Diagnosis:       Other closed fracture of proximal end of right fibula, sequela      (Other closed fracture of proximal end of right fibula, sequela [S82.831S])    Surgeons: Edith Romero DPM Responsible Provider: Crow Fabian MD    Anesthesia Type: MAC ASA Status: 2          Anesthesia Type: No value filed.     Flako Phase I: Flako Score: 10    Flako Phase II: Flako Score: 9      Anesthesia Post Evaluation    Patient location during evaluation: PACU  Patient participation: complete - patient participated  Level of consciousness: awake and alert  Pain score: 2  Airway patency: patent  Nausea & Vomiting: no nausea and no vomiting  Complications: no  Cardiovascular status: blood pressure returned to baseline  Respiratory status: acceptable  Hydration status: euvolemic

## 2022-08-08 LAB — CULTURE SURGICAL: NORMAL

## 2022-08-11 LAB — ANAEROBIC CULTURE: NORMAL

## 2022-09-12 LAB
FUNGUS (MYCOLOGY) CULTURE: NORMAL
FUNGUS STAIN: NORMAL

## 2022-09-27 LAB
AFB CULTURE (MYCOBACTERIA): NORMAL
AFB SMEAR: NORMAL

## 2023-12-11 LAB
CRP SERPL HS-MCNC: 22 MG/L (ref 0–5)
ERYTHROCYTE [DISTWIDTH] IN BLOOD BY AUTOMATED COUNT: 14.4 % (ref 11.5–15)
ERYTHROCYTE [SEDIMENTATION RATE] IN BLOOD BY WESTERGREN METHOD: 62 MM/HR (ref 0–20)
HCT VFR BLD AUTO: 38.1 % (ref 34–48)
HGB BLD-MCNC: 12.1 G/DL (ref 11.5–15.5)
MCH RBC QN AUTO: 29.2 PG (ref 26–35)
MCHC RBC AUTO-ENTMCNC: 31.8 G/DL (ref 32–34.5)
MCV RBC AUTO: 91.8 FL (ref 80–99.9)
PLATELET # BLD AUTO: 358 K/UL (ref 130–450)
PMV BLD AUTO: 10.1 FL (ref 7–12)
RBC # BLD AUTO: 4.15 M/UL (ref 3.5–5.5)
WBC OTHER # BLD: 10.6 K/UL (ref 4.5–11.5)

## 2024-03-18 RX ORDER — FOLIC ACID 1 MG/1
1000 TABLET ORAL DAILY
Qty: 90 TABLET | Refills: 0 | OUTPATIENT
Start: 2024-03-18

## 2024-10-21 ENCOUNTER — EVALUATION (OUTPATIENT)
Dept: OCCUPATIONAL THERAPY | Age: 64
End: 2024-10-21

## 2024-10-21 DIAGNOSIS — T84.84XA PAIN DUE TO RIGHT HIP JOINT PROSTHESIS, INITIAL ENCOUNTER (HCC): Primary | ICD-10-CM

## 2024-10-21 DIAGNOSIS — Z96.641 PAIN DUE TO RIGHT HIP JOINT PROSTHESIS, INITIAL ENCOUNTER (HCC): Primary | ICD-10-CM

## 2024-10-21 PROCEDURE — 97110 THERAPEUTIC EXERCISES: CPT | Performed by: OCCUPATIONAL THERAPIST

## 2024-10-21 PROCEDURE — 97165 OT EVAL LOW COMPLEX 30 MIN: CPT | Performed by: OCCUPATIONAL THERAPIST

## 2024-10-21 PROCEDURE — 97140 MANUAL THERAPY 1/> REGIONS: CPT | Performed by: OCCUPATIONAL THERAPIST

## 2024-10-21 NOTE — PROGRESS NOTES
OCCUPATIONAL THERAPY INITIAL EVALUATION    [facility] PHYSICIANS [facility] OCCUPATIONAL THERAPY  [address]  [address] FLO[address]  [facility] [address]  Dept: [phone]  Loc: [phone]   [facility] OT Fax: [phone]    Date:  10/21/2024  Initial Evaluation Date: 10/21/24   Evaluating Therapist: Angel Luis Lagos OT    Patient Name:  Inga Mon    :  1960    Restrictions/Precautions:  non weight bearing, weight bearing at 6 weeks post op and unlimited rom at 6 weeks., presently rom as tolerated.  restriction of 5#, low fall risk  Diagnosis:  Pain due to internal ortho prosthetic devices , Implants and grafts, T84.84XA       Date of Surgery/Injury: 10/10/24    Insurance/Certification information:  Kurt  Plan of care signed (Y/N): electronic signed    Visit# / total visits:   Referring Practitioner:  Dr. Royer Lazar  Specific Practitioner Orders: non weight bearing, weight bearing at 6 weeks post op and unlimited rom at 6 weeks., presently rom as tolerated.  restriction of 5#. To remove staples at 2-3 weeks.     Assessment of current deficits   [x] Functional mobility  [x] ADLs  [x] Strength   [] Cognition   [] Functional transfers   [x] IADLs  [x] Safety Awareness  [x] Endurance   [] Fine Motor Coordination  [] Balance  [] Vision/perception  [] Sensation    [] Gross Motor Coordination [x] ROM  [x] Pain   [x] Edema    [x] Scar Adhesion/Skin Integrity     OT PLAN OF CARE   OT POC based on physician orders, patient diagnosis and results of clinical assessment  Frequency/Duration  2x week for 18 visits. Certification period: from 10/21/24 to 25  Reassessment date:     Specific OT Treatment to include:     [x] Instruction in HEP                   Modalities:  [x] Therapeutic Exercise        [x] Ultrasound               [] Electrical Stimulation/Attended  [x] PROM/Stretching                    [x] Fluidotherapy          [x]  Paraffin

## 2024-10-29 ENCOUNTER — TREATMENT (OUTPATIENT)
Dept: OCCUPATIONAL THERAPY | Age: 64
End: 2024-10-29

## 2024-10-29 DIAGNOSIS — T84.84XA PAIN IN PROSTHETIC JOINT, INITIAL ENCOUNTER (HCC): Primary | ICD-10-CM

## 2024-10-29 PROCEDURE — 97140 MANUAL THERAPY 1/> REGIONS: CPT | Performed by: OCCUPATIONAL THERAPIST

## 2024-10-29 PROCEDURE — 97110 THERAPEUTIC EXERCISES: CPT | Performed by: OCCUPATIONAL THERAPIST

## 2024-10-29 NOTE — PROGRESS NOTES
OCCUPATIONAL THERAPY DAILY NOTE  [facility] PHYSICIANS [place] [facility] OCCUPATIONAL THERAPY  [address]  2[address] FLO[address]  [facility] [address]  Dept: [phone]  Loc: [phone]   [facility] OT Fax: [phone]      Date:  10/29/2024     Initial Evaluation Date: 10/21/24                              Evaluating Therapist: Angel Luis Lagos OT     Patient Name:  Inga Mon                     :  1960     Restrictions/Precautions:  non weight bearing, weight bearing at 6 weeks post op and unlimited rom at 6 weeks., presently rom as tolerated.  restriction of 5#, low fall risk  Diagnosis:  Pain due to internal ortho prosthetic devices , Implants and grafts, T84.84XA                                                         Date of Surgery/Injury: 10/10/24     Insurance/Certification information:  Kurt  Plan of care signed (Y/N): electronic signed     Visit# / total visits:   Referring Practitioner:  Dr. Royer Lazar  Specific Practitioner Orders: non weight bearing, weight bearing at 6 weeks post op and unlimited rom at 6 weeks., presently rom as tolerated.  restriction of 5#. To remove staples at 2-3 weeks.      Assessment of current deficits   [x] Functional mobility             [x] ADLs           [x] Strength                  [] Cognition   [] Functional transfers           [x] IADLs          [x] Safety Awareness  [x] Endurance   [] Fine Motor Coordination    [] Balance      [] Vision/perception    [] Sensation     [] Gross Motor Coordination [x] ROM           [x] Pain                        [x] Edema          [x] Scar Adhesion/Skin Integrity      OT PLAN OF CARE   OT POC based on physician orders, patient diagnosis and results of clinical assessment  Frequency/Duration  2x week for 18 visits. Certification period: from 10/21/24 to 25  Reassessment date:                                    GOALS (Long term same as Short term):  1) Patient will

## 2024-11-05 ENCOUNTER — TREATMENT (OUTPATIENT)
Dept: OCCUPATIONAL THERAPY | Age: 64
End: 2024-11-05
Payer: MEDICARE

## 2024-11-05 DIAGNOSIS — T84.84XA PAIN IN PROSTHETIC JOINT, INITIAL ENCOUNTER (HCC): Primary | ICD-10-CM

## 2024-11-05 DIAGNOSIS — T84.84XA PAIN DUE TO RIGHT HIP JOINT PROSTHESIS, INITIAL ENCOUNTER (HCC): ICD-10-CM

## 2024-11-05 DIAGNOSIS — Z96.641 PAIN DUE TO RIGHT HIP JOINT PROSTHESIS, INITIAL ENCOUNTER (HCC): ICD-10-CM

## 2024-11-05 PROCEDURE — 97110 THERAPEUTIC EXERCISES: CPT | Performed by: OCCUPATIONAL THERAPIST

## 2024-11-05 PROCEDURE — 97140 MANUAL THERAPY 1/> REGIONS: CPT | Performed by: OCCUPATIONAL THERAPIST

## 2024-11-05 NOTE — PROGRESS NOTES
OCCUPATIONAL THERAPY DAILY NOTE  [facility] PHYSICIANS [facility] OCCUPATIONAL THERAPY  [address]  2[address] FLOOR  [facility] [address]  Dept: [phone]  Loc: [phone]   [facility] OT Fax: [phone]      Date:  2024     Initial Evaluation Date: 10/21/24                              Evaluating Therapist: Angel Luis Lagos OT     Patient Name:  Inga Mon                     :  1960     Restrictions/Precautions:  non weight bearing, weight bearing at 6 weeks post op and unlimited rom at 6 weeks., presently rom as tolerated.  restriction of 5#, low fall risk  Diagnosis:  Pain due to internal ortho prosthetic devices , Implants and grafts, T84.84XA                                                         Date of Surgery/Injury: 10/10/24     Insurance/Certification information:  Kurt  Plan of care signed (Y/N): electronic signed     Visit# / total visits: 3 / 18  Referring Practitioner:  Dr. Royer Lazar  Specific Practitioner Orders: non weight bearing, weight bearing at 6 weeks post op and unlimited rom at 6 weeks., presently rom as tolerated.  restriction of 5#. To remove staples at 2-3 weeks.      Assessment of current deficits   [x] Functional mobility             [x] ADLs           [x] Strength                  [] Cognition   [] Functional transfers           [x] IADLs          [x] Safety Awareness  [x] Endurance   [] Fine Motor Coordination    [] Balance      [] Vision/perception    [] Sensation     [] Gross Motor Coordination [x] ROM           [x] Pain                        [x] Edema          [x] Scar Adhesion/Skin Integrity      OT PLAN OF CARE   OT POC based on physician orders, patient diagnosis and results of clinical assessment  Frequency/Duration  2x week for 18 visits. Certification period: from 10/21/24 to 25  Reassessment date:                                    GOALS (Long term same as Short term):  1) Patient will demonstrate

## 2024-11-12 ENCOUNTER — TREATMENT (OUTPATIENT)
Dept: OCCUPATIONAL THERAPY | Age: 64
End: 2024-11-12
Payer: MEDICARE

## 2024-11-12 DIAGNOSIS — T84.84XA PAIN IN PROSTHETIC JOINT, INITIAL ENCOUNTER (HCC): Primary | ICD-10-CM

## 2024-11-12 PROCEDURE — 97140 MANUAL THERAPY 1/> REGIONS: CPT | Performed by: OCCUPATIONAL THERAPIST

## 2024-11-12 PROCEDURE — 97110 THERAPEUTIC EXERCISES: CPT | Performed by: OCCUPATIONAL THERAPIST

## 2024-11-12 NOTE — PROGRESS NOTES
OCCUPATIONAL THERAPY DAILY NOTE  [facility] PHYSICIANS [facility] OCCUPATIONAL THERAPY  [address]  2ND FLO[address]  [facility] [address]  Dept: [phone]  Loc: [phone]   [facility] OT Fax: [phone]      Date:  2024     Initial Evaluation Date: 10/21/24                              Evaluating Therapist: Angel Luis Lagos OT     Patient Name:  Inga Mon                     :  1960     Restrictions/Precautions:  non weight bearing, weight bearing at 6 weeks post op and unlimited rom at 6 weeks., presently rom as tolerated.  restriction of 8#, low fall risk  Diagnosis:  Pain due to internal ortho prosthetic devices , Implants and grafts, T84.84XA                                                         Date of Surgery/Injury: 10/10/24     Insurance/Certification information:  Kurt  Plan of care signed (Y/N): electronic signed     Visit# / total visits:   Referring Practitioner:  Dr. Royer Lazar  Specific Practitioner Orders: non weight bearing, weight bearing at 6 weeks post op and unlimited rom at 6 weeks., presently rom as tolerated.  restriction of 5#. To remove staples at 2-3 weeks.      Assessment of current deficits   [x] Functional mobility             [x] ADLs           [x] Strength                  [] Cognition   [] Functional transfers           [x] IADLs          [x] Safety Awareness  [x] Endurance   [] Fine Motor Coordination    [] Balance      [] Vision/perception    [] Sensation     [] Gross Motor Coordination [x] ROM           [x] Pain                        [x] Edema          [x] Scar Adhesion/Skin Integrity      OT PLAN OF CARE   OT POC based on physician orders, patient diagnosis and results of clinical assessment  Frequency/Duration  2x week for 18 visits. Certification period: from 10/21/24 to 25  Reassessment date:                                    GOALS (Long term same as Short term):  1) Patient will demonstrate

## 2024-11-19 ENCOUNTER — TREATMENT (OUTPATIENT)
Dept: OCCUPATIONAL THERAPY | Age: 64
End: 2024-11-19

## 2024-11-19 DIAGNOSIS — Z96.641 PAIN DUE TO RIGHT HIP JOINT PROSTHESIS, INITIAL ENCOUNTER (HCC): ICD-10-CM

## 2024-11-19 DIAGNOSIS — T84.84XA PAIN DUE TO RIGHT HIP JOINT PROSTHESIS, INITIAL ENCOUNTER (HCC): ICD-10-CM

## 2024-11-19 DIAGNOSIS — T84.84XA PAIN IN PROSTHETIC JOINT, INITIAL ENCOUNTER (HCC): Primary | ICD-10-CM

## 2024-11-19 NOTE — PROGRESS NOTES
OCCUPATIONAL THERAPY DAILY NOTE  [facility] PHYSICIANS [facility] OCCUPATIONAL THERAPY  [address]  2[address] FLO[address]  [facility] [address]  Dept: [phone]  Loc: [phone]   [facility] OT Fax: [phone]      Date:  2024     Initial Evaluation Date: 10/21/24                              Evaluating Therapist: Angel Luis Lagos OT     Patient Name:  Inga Mon                     :  1960     Restrictions/Precautions:  non weight bearing, weight bearing at 6 weeks post op and unlimited rom at 6 weeks., presently rom as tolerated.  restriction of 8#, low fall risk  Diagnosis:  Pain due to internal ortho prosthetic devices , Implants and grafts, T84.84XA                                                         Date of Surgery/Injury: 10/10/24     Insurance/Certification information:  Kurt  Plan of care signed (Y/N): electronic signed     Visit# / total visits:   Referring Practitioner:  Dr. Royer Lazar  Specific Practitioner Orders: non weight bearing, weight bearing at 6 weeks post op and unlimited rom at 6 weeks., presently rom as tolerated.  restriction of 5#. To remove staples at 2-3 weeks.      Assessment of current deficits   [x] Functional mobility             [x] ADLs           [x] Strength                  [] Cognition   [] Functional transfers           [x] IADLs          [x] Safety Awareness  [x] Endurance   [] Fine Motor Coordination    [] Balance      [] Vision/perception    [] Sensation     [] Gross Motor Coordination [x] ROM           [x] Pain                        [x] Edema          [x] Scar Adhesion/Skin Integrity      OT PLAN OF CARE   OT POC based on physician orders, patient diagnosis and results of clinical assessment  Frequency/Duration  2x week for 18 visits. Certification period: from 10/21/24 to 25  Reassessment date:                                    GOALS (Long term same as Short term):  1) Patient will demonstrate

## 2024-11-21 ENCOUNTER — TREATMENT (OUTPATIENT)
Dept: OCCUPATIONAL THERAPY | Age: 64
End: 2024-11-21

## 2024-11-21 DIAGNOSIS — T84.84XA PAIN DUE TO RIGHT HIP JOINT PROSTHESIS, INITIAL ENCOUNTER (HCC): ICD-10-CM

## 2024-11-21 DIAGNOSIS — Z96.641 PAIN DUE TO RIGHT HIP JOINT PROSTHESIS, INITIAL ENCOUNTER (HCC): ICD-10-CM

## 2024-11-21 DIAGNOSIS — T84.84XA PAIN IN PROSTHETIC JOINT, INITIAL ENCOUNTER (HCC): Primary | ICD-10-CM

## 2024-11-21 NOTE — PROGRESS NOTES
elbow to help reduce swelling   HEP x Elbow arom as tolerated, ulnar nerve glides, light adl's     Assessment/Comments: good overall progress, rom is good. Strengthening yoly well. Cont to progress as tolerated.       -Rehab Potential: Good   -Patient Response to Treatment: good    Patient. Education:  [] Plans/Goals, Risks/Benefits discussed  [x] Home exercise program  Method of Education: [x] Verbal  [x] Demo  [] Written  Comprehension of Education:  [x] Verbalizes understanding.  [x] Demonstrates understanding.  [] Needs Review.  [] Demonstrates/verbalizes understanding of HEP/Ed previously given.      Time In:11            Time Out: 12             CODE  Minutes  Units   17507 Fluidotherapy     87441 Paraffin     73649 Ultrasound     42055 Electrical Stim - Attended     94233 Iontophoresis     44092 Therapeutic Ex 30 2   79208 Therapeutic Activity     97780 Neuromuscular Re-Ed     30150 Manual Therapy 15 1   42691 ADL/COMP Tech Train     52578 Orthotic Management/Training      Other  MHP 10 -               Total  55 3             Plan: OT 2/week for 18 sessions    [x]  Continues Plan of care with focus on arom, strengthening, improving sensation, I with ADL, I with HEP: Treatment covered based on POC and graduated to patient's progress. Pt education continues at each visit to obtain maximum benefits from skilled OT intervention.  []  Alter Plan of care:   []  Discharge:      Angel Luis Lagos OT /L,T [id number]

## 2024-11-26 ENCOUNTER — TREATMENT (OUTPATIENT)
Dept: OCCUPATIONAL THERAPY | Age: 64
End: 2024-11-26
Payer: MEDICARE

## 2024-11-26 DIAGNOSIS — T84.84XA PAIN IN PROSTHETIC JOINT, INITIAL ENCOUNTER (HCC): Primary | ICD-10-CM

## 2024-11-26 DIAGNOSIS — Z96.641 PAIN DUE TO RIGHT HIP JOINT PROSTHESIS, INITIAL ENCOUNTER (HCC): ICD-10-CM

## 2024-11-26 DIAGNOSIS — T84.84XA PAIN DUE TO RIGHT HIP JOINT PROSTHESIS, INITIAL ENCOUNTER (HCC): ICD-10-CM

## 2024-11-26 PROCEDURE — 97140 MANUAL THERAPY 1/> REGIONS: CPT | Performed by: OCCUPATIONAL THERAPIST

## 2024-11-26 PROCEDURE — 97110 THERAPEUTIC EXERCISES: CPT | Performed by: OCCUPATIONAL THERAPIST

## 2024-12-03 ENCOUNTER — TREATMENT (OUTPATIENT)
Dept: OCCUPATIONAL THERAPY | Age: 64
End: 2024-12-03
Payer: MEDICARE

## 2024-12-03 DIAGNOSIS — T84.84XA PAIN IN PROSTHETIC JOINT, INITIAL ENCOUNTER (HCC): Primary | ICD-10-CM

## 2024-12-03 PROCEDURE — 97140 MANUAL THERAPY 1/> REGIONS: CPT | Performed by: OCCUPATIONAL THERAPIST

## 2024-12-03 PROCEDURE — 97110 THERAPEUTIC EXERCISES: CPT | Performed by: OCCUPATIONAL THERAPIST

## 2024-12-03 NOTE — PROGRESS NOTES
OCCUPATIONAL THERAPY DAILY NOTE/DISCHARGE  [facility] PHYSICIANS [facility] OCCUPATIONAL THERAPY  [address]  [address]  [facility] [address]  Dept: [phone]  Loc: [phone]   [facility] OT Fax: [phone]      Date:  12/3/2024     Initial Evaluation Date: 10/21/24                              Evaluating Therapist: Angel Luis Lagos OT     Patient Name:  Inga Mon                     :  1960     Restrictions/Precautions:  non weight bearing, weight bearing at 6 weeks post op and unlimited rom at 6 weeks., presently rom as tolerated.  restriction of 8#, low fall risk  Diagnosis:  Pain due to internal ortho prosthetic devices , Implants and grafts, T84.84XA                                                         Date of Surgery/Injury: 10/10/24     Insurance/Certification information:  Kurt  Plan of care signed (Y/N): electronic signed     Visit# / total visits:   Referring Practitioner:  Dr. Royer Lazar  Specific Practitioner Orders: non weight bearing, weight bearing at 6 weeks post op and unlimited rom at 6 weeks., presently rom as tolerated.  restriction of 5#. To remove staples at 2-3 weeks.      Assessment of current deficits   [x] Functional mobility             [x] ADLs           [x] Strength                  [] Cognition   [] Functional transfers           [x] IADLs          [x] Safety Awareness  [x] Endurance   [] Fine Motor Coordination    [] Balance      [] Vision/perception    [] Sensation     [] Gross Motor Coordination [x] ROM           [x] Pain                        [x] Edema          [x] Scar Adhesion/Skin Integrity      OT PLAN OF CARE   OT POC based on physician orders, patient diagnosis and results of clinical assessment  Frequency/Duration  2x week for 18 visits. Certification period: from 10/21/24 to 25  Reassessment date:                                    GOALS (Long term same as Short term): DISCHARGE

## 2025-01-05 NOTE — PROGRESS NOTES
OCCUPATIONAL THERAPY DAILY NOTE  [facility] PHYSICIANS [facility] OCCUPATIONAL THERAPY  [address]  [address]  [facility] [address]  Dept: [phone]  Loc: [phone]   [facility] OT Fax: [phone]      Date:  2024     Initial Evaluation Date: 10/21/24                              Evaluating Therapist: Angel Luis Lagos OT     Patient Name:  Inga Mno                     :  1960     Restrictions/Precautions:  non weight bearing, weight bearing at 6 weeks post op and unlimited rom at 6 weeks., presently rom as tolerated.  restriction of 8#, low fall risk  Diagnosis:  Pain due to internal ortho prosthetic devices , Implants and grafts, T84.84XA                                                         Date of Surgery/Injury: 10/10/24     Insurance/Certification information:  Kurt  Plan of care signed (Y/N): electronic signed     Visit# / total visits:   Referring Practitioner:  Dr. Royer Lazar  Specific Practitioner Orders: non weight bearing, weight bearing at 6 weeks post op and unlimited rom at 6 weeks., presently rom as tolerated.  restriction of 5#. To remove staples at 2-3 weeks.      Assessment of current deficits   [x] Functional mobility             [x] ADLs           [x] Strength                  [] Cognition   [] Functional transfers           [x] IADLs          [x] Safety Awareness  [x] Endurance   [] Fine Motor Coordination    [] Balance      [] Vision/perception    [] Sensation     [] Gross Motor Coordination [x] ROM           [x] Pain                        [x] Edema          [x] Scar Adhesion/Skin Integrity      OT PLAN OF CARE   OT POC based on physician orders, patient diagnosis and results of clinical assessment  Frequency/Duration  2x week for 18 visits. Certification period: from 10/21/24 to 25  Reassessment date:                                    GOALS (Long term same as Short term):  1) Patient will demonstrate 
0 = swallows foods/liquids without difficulty

## (undated) DEVICE — BUR SURG L70MM HD L8MM DIA4MM STR SHANK 235MM 8 FLUT MIC

## (undated) DEVICE — BIT DRL L5IN DIA2MM STD ST S STL TWST BUSA

## (undated) DEVICE — TOWEL,OR,DSP,ST,BLUE,STD,6/PK,12PK/CS: Brand: MEDLINE

## (undated) DEVICE — BLADE SAW SAG OSCIL LNG MED 9X31MM

## (undated) DEVICE — COVER HNDL LT DISP

## (undated) DEVICE — INSTRUMENT SYSTEM 4 BATTERY REUSABLE

## (undated) DEVICE — SET PSI

## (undated) DEVICE — CHLORAPREP 26ML ORANGE

## (undated) DEVICE — DRAPE CARM MINI FOR IMAG SYS INSIGHT FLROSCN

## (undated) DEVICE — BANDAGE,GAUZE,CONFORMING,4"X75",STRL,LF: Brand: MEDLINE

## (undated) DEVICE — BNDG,ELSTC,MATRIX,STRL,4"X5YD,LF,HOOK&LP: Brand: MEDLINE

## (undated) DEVICE — BANDAGE,GAUZE,CONFORMING,3"X75",STRL,LF: Brand: MEDLINE

## (undated) DEVICE — DRAPE,EXTREMITY,89X128,STERILE: Brand: MEDLINE

## (undated) DEVICE — PACK PROCEDURE SURG GEN CUST

## (undated) DEVICE — GAUZE,SPONGE,4"X4",8PLY,STRL,LF,10/TRAY: Brand: MEDLINE

## (undated) DEVICE — INTENDED FOR TISSUE SEPARATION, AND OTHER PROCEDURES THAT REQUIRE A SHARP SURGICAL BLADE TO PUNCTURE OR CUT.: Brand: BARD-PARKER ® STAINLESS STEEL BLADES

## (undated) DEVICE — MARKER,SKIN,WI/RULER AND LABELS: Brand: MEDLINE

## (undated) DEVICE — DOUBLE BASIN SET: Brand: MEDLINE INDUSTRIES, INC.

## (undated) DEVICE — 1000 S-DRAPE TOWEL DRAPE 10/BX: Brand: STERI-DRAPE™

## (undated) DEVICE — PRECISION THIN (9.0 X 0.38 X 25.0MM)

## (undated) DEVICE — ELECTRODE PT RET AD L9FT HI MOIST COND ADH HYDRGEL CORDED

## (undated) DEVICE — GLOVE ORANGE PI 8   MSG9080

## (undated) DEVICE — ZIMMER® STERILE DISPOSABLE TOURNIQUET CUFF WITH PLC, DUAL PORT, SINGLE BLADDER, 12 IN. (30 CM)

## (undated) DEVICE — SYSTEM TPS ORTHO

## (undated) DEVICE — NEEDLE HYPO 25GA L1.5IN BLU POLYPR HUB S STL REG BVL STR

## (undated) DEVICE — SYRINGE, LUER LOCK, 10ML: Brand: MEDLINE

## (undated) DEVICE — 4-PORT MANIFOLD: Brand: NEPTUNE 2

## (undated) DEVICE — C-ARM: Brand: UNBRANDED

## (undated) DEVICE — RETRACTOR ORTH W06XL475IN 1 PRNG HND HELD WIDE BLNT HOHMN

## (undated) DEVICE — TUBING SUCT 12FR MAL ALUM SHFT FN CAP VENT UNIV CONN W/ OBT

## (undated) DEVICE — BANDAGE,ELASTIC,ESMARK,STERILE,4"X9',LF: Brand: MEDLINE

## (undated) DEVICE — SOLUTION IV IRRIG POUR BRL 0.9% SODIUM CHL 2F7124

## (undated) DEVICE — SHOE POSTOP M WOMAN 6-8 UNIV FOAM TRICOT SEMI FLX SKID